# Patient Record
Sex: MALE | Race: WHITE | NOT HISPANIC OR LATINO | Employment: OTHER | URBAN - METROPOLITAN AREA
[De-identification: names, ages, dates, MRNs, and addresses within clinical notes are randomized per-mention and may not be internally consistent; named-entity substitution may affect disease eponyms.]

---

## 2023-11-01 ENCOUNTER — HOSPITAL ENCOUNTER (OUTPATIENT)
Facility: HOSPITAL | Age: 78
Setting detail: OBSERVATION
Discharge: HOME | End: 2023-11-03
Attending: EMERGENCY MEDICINE | Admitting: INTERNAL MEDICINE
Payer: MEDICARE

## 2023-11-01 ENCOUNTER — APPOINTMENT (OUTPATIENT)
Dept: RADIOLOGY | Facility: HOSPITAL | Age: 78
End: 2023-11-01
Payer: MEDICARE
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
## 2023-11-01 DIAGNOSIS — S09.90XA CLOSED HEAD INJURY, INITIAL ENCOUNTER: Primary | ICD-10-CM

## 2023-11-01 LAB
ALBUMIN SERPL BCP-MCNC: 4.1 G/DL (ref 3.4–5)
ALP SERPL-CCNC: 95 U/L (ref 33–136)
ALT SERPL W P-5'-P-CCNC: 10 U/L (ref 10–52)
ANION GAP SERPL CALC-SCNC: 15 MMOL/L (ref 10–20)
APPEARANCE UR: CLEAR
AST SERPL W P-5'-P-CCNC: 12 U/L (ref 9–39)
BACTERIA #/AREA URNS AUTO: ABNORMAL /HPF
BILIRUB SERPL-MCNC: 0.4 MG/DL (ref 0–1.2)
BILIRUB UR STRIP.AUTO-MCNC: NEGATIVE MG/DL
BUN SERPL-MCNC: 40 MG/DL (ref 6–23)
CALCIUM SERPL-MCNC: 9.3 MG/DL (ref 8.6–10.3)
CHLORIDE SERPL-SCNC: 107 MMOL/L (ref 98–107)
CK SERPL-CCNC: 74 U/L (ref 0–325)
CO2 SERPL-SCNC: 27 MMOL/L (ref 21–32)
COLOR UR: ABNORMAL
CREAT SERPL-MCNC: 1.34 MG/DL (ref 0.5–1.3)
ERYTHROCYTE [DISTWIDTH] IN BLOOD BY AUTOMATED COUNT: 15.7 % (ref 11.5–14.5)
GFR SERPL CREATININE-BSD FRML MDRD: 54 ML/MIN/1.73M*2
GLUCOSE SERPL-MCNC: 110 MG/DL (ref 74–99)
GLUCOSE UR STRIP.AUTO-MCNC: NEGATIVE MG/DL
HCT VFR BLD AUTO: 38.5 % (ref 41–52)
HGB BLD-MCNC: 12.3 G/DL (ref 13.5–17.5)
INR PPP: 1 (ref 0.9–1.1)
KETONES UR STRIP.AUTO-MCNC: NEGATIVE MG/DL
LEUKOCYTE ESTERASE UR QL STRIP.AUTO: ABNORMAL
MAGNESIUM SERPL-MCNC: 2 MG/DL (ref 1.6–2.4)
MCH RBC QN AUTO: 29.8 PG (ref 26–34)
MCHC RBC AUTO-ENTMCNC: 31.9 G/DL (ref 32–36)
MCV RBC AUTO: 93 FL (ref 80–100)
NITRITE UR QL STRIP.AUTO: NEGATIVE
NRBC BLD-RTO: 0 /100 WBCS (ref 0–0)
PH UR STRIP.AUTO: 7 [PH]
PHOSPHATE SERPL-MCNC: 3.4 MG/DL (ref 2.5–4.9)
PLATELET # BLD AUTO: 260 X10*3/UL (ref 150–450)
POTASSIUM SERPL-SCNC: 5 MMOL/L (ref 3.5–5.3)
PROT SERPL-MCNC: 7.2 G/DL (ref 6.4–8.2)
PROT UR STRIP.AUTO-MCNC: NEGATIVE MG/DL
PROTHROMBIN TIME: 11.2 SECONDS (ref 9.8–12.8)
RBC # BLD AUTO: 4.13 X10*6/UL (ref 4.5–5.9)
RBC # UR STRIP.AUTO: NEGATIVE /UL
RBC #/AREA URNS AUTO: ABNORMAL /HPF
SODIUM SERPL-SCNC: 144 MMOL/L (ref 136–145)
SP GR UR STRIP.AUTO: 1
UROBILINOGEN UR STRIP.AUTO-MCNC: <2 MG/DL
WBC # BLD AUTO: 8.4 X10*3/UL (ref 4.4–11.3)
WBC #/AREA URNS AUTO: ABNORMAL /HPF

## 2023-11-01 PROCEDURE — 71046 X-RAY EXAM CHEST 2 VIEWS: CPT | Mod: FR

## 2023-11-01 PROCEDURE — 73070 X-RAY EXAM OF ELBOW: CPT | Mod: RT,FR

## 2023-11-01 PROCEDURE — 99285 EMERGENCY DEPT VISIT HI MDM: CPT | Mod: 25 | Performed by: EMERGENCY MEDICINE

## 2023-11-01 PROCEDURE — 36415 COLL VENOUS BLD VENIPUNCTURE: CPT

## 2023-11-01 PROCEDURE — 70450 CT HEAD/BRAIN W/O DYE: CPT

## 2023-11-01 PROCEDURE — 96360 HYDRATION IV INFUSION INIT: CPT | Performed by: EMERGENCY MEDICINE

## 2023-11-01 PROCEDURE — 82550 ASSAY OF CK (CPK): CPT

## 2023-11-01 PROCEDURE — 73070 X-RAY EXAM OF ELBOW: CPT | Mod: RIGHT SIDE | Performed by: RADIOLOGY

## 2023-11-01 PROCEDURE — 83735 ASSAY OF MAGNESIUM: CPT

## 2023-11-01 PROCEDURE — 85027 COMPLETE CBC AUTOMATED: CPT

## 2023-11-01 PROCEDURE — 87086 URINE CULTURE/COLONY COUNT: CPT | Mod: AHULAB

## 2023-11-01 PROCEDURE — 2500000004 HC RX 250 GENERAL PHARMACY W/ HCPCS (ALT 636 FOR OP/ED): Performed by: EMERGENCY MEDICINE

## 2023-11-01 PROCEDURE — 72125 CT NECK SPINE W/O DYE: CPT

## 2023-11-01 PROCEDURE — 84100 ASSAY OF PHOSPHORUS: CPT

## 2023-11-01 PROCEDURE — 80053 COMPREHEN METABOLIC PANEL: CPT

## 2023-11-01 PROCEDURE — S0166 INJ OLANZAPINE 2.5MG: HCPCS | Performed by: EMERGENCY MEDICINE

## 2023-11-01 PROCEDURE — 72170 X-RAY EXAM OF PELVIS: CPT | Mod: FOREIGN READ | Performed by: RADIOLOGY

## 2023-11-01 PROCEDURE — 85610 PROTHROMBIN TIME: CPT

## 2023-11-01 PROCEDURE — 70450 CT HEAD/BRAIN W/O DYE: CPT | Performed by: SURGERY

## 2023-11-01 PROCEDURE — 72170 X-RAY EXAM OF PELVIS: CPT | Mod: FR

## 2023-11-01 PROCEDURE — 81003 URINALYSIS AUTO W/O SCOPE: CPT

## 2023-11-01 PROCEDURE — 72125 CT NECK SPINE W/O DYE: CPT | Performed by: SURGERY

## 2023-11-01 PROCEDURE — 2500000004 HC RX 250 GENERAL PHARMACY W/ HCPCS (ALT 636 FOR OP/ED)

## 2023-11-01 PROCEDURE — 71046 X-RAY EXAM CHEST 2 VIEWS: CPT | Mod: FOREIGN READ | Performed by: RADIOLOGY

## 2023-11-01 RX ORDER — OLANZAPINE 10 MG/2ML
2.5 INJECTION, POWDER, FOR SOLUTION INTRAMUSCULAR EVERY 6 HOURS PRN
Status: DISCONTINUED | OUTPATIENT
Start: 2023-11-01 | End: 2023-11-03 | Stop reason: HOSPADM

## 2023-11-01 RX ADMIN — SODIUM CHLORIDE, SODIUM LACTATE, POTASSIUM CHLORIDE, AND CALCIUM CHLORIDE 1000 ML: 600; 310; 30; 20 INJECTION, SOLUTION INTRAVENOUS at 20:00

## 2023-11-01 RX ADMIN — OLANZAPINE 2.5 MG: 10 INJECTION, POWDER, FOR SOLUTION INTRAMUSCULAR at 20:27

## 2023-11-01 ASSESSMENT — COLUMBIA-SUICIDE SEVERITY RATING SCALE - C-SSRS
1. IN THE PAST MONTH, HAVE YOU WISHED YOU WERE DEAD OR WISHED YOU COULD GO TO SLEEP AND NOT WAKE UP?: NO
2. HAVE YOU ACTUALLY HAD ANY THOUGHTS OF KILLING YOURSELF?: NO
6. HAVE YOU EVER DONE ANYTHING, STARTED TO DO ANYTHING, OR PREPARED TO DO ANYTHING TO END YOUR LIFE?: NO

## 2023-11-01 ASSESSMENT — PAIN - FUNCTIONAL ASSESSMENT: PAIN_FUNCTIONAL_ASSESSMENT: 0-10

## 2023-11-01 ASSESSMENT — PAIN SCALES - GENERAL: PAINLEVEL_OUTOF10: 0 - NO PAIN

## 2023-11-01 NOTE — ED PROVIDER NOTES
HPI   Chief Complaint   Patient presents with    Fall       HPI  Patient is a 78-year-old male with past medical history of multi-infarct dementia, A-fib, DVT on Xarelto hypertension, hyperlipidemia, dysarthria, BPH, CVA with left-sided residual weakness the presents emergency room for after fall.  Patient is a poor historian and was unable to give me any information about the mechanism of the fall.  Patient is coming from a SNF/memory care per EMS denies loss of consciousness suggest a mechanical fall.  Patient had a witnessed fall he hit his head on the wall after tripping over his feet and then landed backwards falling on his gluts. Per EMS and SNF this is his normal mentation.  She has a right-sided temporal abrasion with some bleeding.                   [place] Coma Scale Score: 14                  Patient History   Past Medical History:   Diagnosis Date    Personal history of other diseases of the circulatory system     History of hypertension    Personal history of other diseases of the circulatory system 06/04/2018    History of atrial fibrillation    Personal history of other endocrine, nutritional and metabolic disease     History of hyperlipidemia    Personal history of other medical treatment     History of cardioversion    Unspecified atrial fibrillation (CMS/HCC)     Atrial fibrillation status post cardioversion     Past Surgical History:   Procedure Laterality Date    MR HEAD ANGIO WO IV CONTRAST  2/25/2023    MR HEAD ANGIO WO IV CONTRAST PAR MRI    MR NECK ANGIO WO IV CONTRAST  2/25/2023    MR NECK ANGIO WO IV CONTRAST PAR MRI     No family history on file.  Social History     Tobacco Use    Smoking status: Not on file    Smokeless tobacco: Not on file   Substance Use Topics    Alcohol use: Not on file    Drug use: Not on file       Physical Exam   ED Triage Vitals [11/01/23 1823]   Temp Heart Rate Resp BP   36.4 °C (97.6 °F) 93 18 148/79      SpO2 Temp Source Heart Rate Source Patient Position   100  % Temporal -- --      BP Location FiO2 (%)     -- --       Physical Exam  Constitutional:       Appearance: Normal appearance. He is normal weight.   HENT:      Head:      Comments: Traumatic, right side laceration temporal region     Right Ear: Tympanic membrane and ear canal normal.      Left Ear: Tympanic membrane and ear canal normal.      Nose: Nose normal.      Mouth/Throat:      Mouth: Mucous membranes are moist.      Pharynx: Oropharynx is clear.   Eyes:      Conjunctiva/sclera: Conjunctivae normal.      Pupils: Pupils are equal, round, and reactive to light.   Cardiovascular:      Rate and Rhythm: Tachycardia present. Rhythm irregular.      Pulses: Normal pulses.      Heart sounds: Normal heart sounds.   Pulmonary:      Effort: Pulmonary effort is normal.      Breath sounds: Normal breath sounds.   Abdominal:      General: Abdomen is flat.      Palpations: Abdomen is soft.   Musculoskeletal:         General: Normal range of motion.      Cervical back: Normal range of motion and neck supple.   Skin:     General: Skin is warm and dry.      Capillary Refill: Capillary refill takes less than 2 seconds.      Coloration: Skin is not jaundiced.      Findings: No erythema, lesion or rash.      Comments: Right abrasion on the dorsal proximal forearm closer to elbow   Neurological:      Mental Status: He is alert. Mental status is at baseline.      Comments: AXO1, able to follow commands, speaking with word salad however able to say yes or no questions.  Physical exam states that the pain on palpation then later states not having pain.         ED Course & MDM        Medical Decision Making  Patient is a 78-year-old male with past medical history of multi-infarct dementia, A-fib, DVT on Xarelto hypertension, hyperlipidemia, dysarthria, BPH, CVA with left-sided residual weakness the presents emergency room for after fall.  Given the fall, there needs to be some consideration for cerebral hemorrhage, subdural  hemorrhage, epidural hemorrhage.  EKG, phosphorus, magnesium, creatinine kinase, CBC, CMP, INR, UA have been ordered.  Right-sided x-ray of elbow, x-ray of pelvis, chest x-ray, CT C-spine without contrast, CT head without contrast been ordered.    CBC within normal limits.  CMP shows a elevated creatinine of 1.34 with a BUN of 40 not indicative of an ELLY.  Phosphorus, magnesium, creatinine and kinase is within normal limits.  Normal and INR.  1 L bolus of LR given. Patient continues to be slightly agitated and tries to get out of bed.  Patient was put in Trendelenburg and given 2.5 mg of Zyprexa subcutaneous. EKG shows a heart rate of 90, incalculable CO interval, QRS 82, , A-fib, no ST elevation, no ST depression, PVCs seen in leads II, V6, V5, V4.      Chest x-ray shows no pneumothorax or pleural effusion.  X-ray of pelvis shows no traumatic fractures, but osteoarthritis.  X ray of the elbow shows no traumatic fracture.  CT head without contrast shows no evidence of acute intracranial hemorrhage.  CT cervical spine shows no C-spine fractures or traumatic process.    UA shows a small leukocyte esterase negative for nitrates minimal bacteria in the urine, which indicates less likely a UTI.  Patient is being admitted for observation.      Procedure  Procedures     Purnima Frost MD  Resident  11/01/23 [id number]

## 2023-11-01 NOTE — ED TRIAGE NOTES
Pt had a mechanical fall with a head injury. Per EMS pt is from memory care and this is normal mentation. Pt is not on anticoagulation therapy. EMS states the SNF denies +LOC.
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
## 2023-11-02 LAB
ANION GAP SERPL CALC-SCNC: 15 MMOL/L (ref 10–20)
BUN SERPL-MCNC: 29 MG/DL (ref 6–23)
CALCIUM SERPL-MCNC: 9.2 MG/DL (ref 8.6–10.3)
CARDIAC TROPONIN I PNL SERPL HS: 10 NG/L (ref 0–20)
CHLORIDE SERPL-SCNC: 109 MMOL/L (ref 98–107)
CO2 SERPL-SCNC: 22 MMOL/L (ref 21–32)
CREAT SERPL-MCNC: 1.15 MG/DL (ref 0.5–1.3)
ERYTHROCYTE [DISTWIDTH] IN BLOOD BY AUTOMATED COUNT: 15.3 % (ref 11.5–14.5)
GFR SERPL CREATININE-BSD FRML MDRD: 65 ML/MIN/1.73M*2
GLUCOSE SERPL-MCNC: 166 MG/DL (ref 74–99)
HCT VFR BLD AUTO: 46.3 % (ref 41–52)
HGB BLD-MCNC: 14.5 G/DL (ref 13.5–17.5)
HOLD SPECIMEN: NORMAL
MCH RBC QN AUTO: 29.8 PG (ref 26–34)
MCHC RBC AUTO-ENTMCNC: 31.3 G/DL (ref 32–36)
MCV RBC AUTO: 95 FL (ref 80–100)
NRBC BLD-RTO: 0 /100 WBCS (ref 0–0)
PLATELET # BLD AUTO: 281 X10*3/UL (ref 150–450)
POTASSIUM SERPL-SCNC: 4.9 MMOL/L (ref 3.5–5.3)
POTASSIUM SERPL-SCNC: 5.8 MMOL/L (ref 3.5–5.3)
RBC # BLD AUTO: 4.86 X10*6/UL (ref 4.5–5.9)
SODIUM SERPL-SCNC: 140 MMOL/L (ref 136–145)
WBC # BLD AUTO: 10.1 X10*3/UL (ref 4.4–11.3)

## 2023-11-02 PROCEDURE — 84132 ASSAY OF SERUM POTASSIUM: CPT | Mod: 59 | Performed by: PHYSICIAN ASSISTANT

## 2023-11-02 PROCEDURE — 99223 1ST HOSP IP/OBS HIGH 75: CPT | Performed by: PHYSICIAN ASSISTANT

## 2023-11-02 PROCEDURE — 36415 COLL VENOUS BLD VENIPUNCTURE: CPT | Performed by: PHYSICIAN ASSISTANT

## 2023-11-02 PROCEDURE — 96361 HYDRATE IV INFUSION ADD-ON: CPT

## 2023-11-02 PROCEDURE — 2500000004 HC RX 250 GENERAL PHARMACY W/ HCPCS (ALT 636 FOR OP/ED): Performed by: PHYSICIAN ASSISTANT

## 2023-11-02 PROCEDURE — 80048 BASIC METABOLIC PNL TOTAL CA: CPT | Performed by: PHYSICIAN ASSISTANT

## 2023-11-02 PROCEDURE — 96372 THER/PROPH/DIAG INJ SC/IM: CPT | Performed by: PHYSICIAN ASSISTANT

## 2023-11-02 PROCEDURE — G0378 HOSPITAL OBSERVATION PER HR: HCPCS

## 2023-11-02 PROCEDURE — 85027 COMPLETE CBC AUTOMATED: CPT | Performed by: PHYSICIAN ASSISTANT

## 2023-11-02 PROCEDURE — 84484 ASSAY OF TROPONIN QUANT: CPT | Performed by: PHYSICIAN ASSISTANT

## 2023-11-02 RX ORDER — ACETAMINOPHEN 325 MG/1
2 TABLET ORAL EVERY 6 HOURS PRN
COMMUNITY

## 2023-11-02 RX ORDER — PANTOPRAZOLE SODIUM 40 MG/1
40 TABLET, DELAYED RELEASE ORAL
Status: DISCONTINUED | OUTPATIENT
Start: 2023-11-03 | End: 2023-11-03 | Stop reason: HOSPADM

## 2023-11-02 RX ORDER — LOPERAMIDE HYDROCHLORIDE 2 MG/1
2 CAPSULE ORAL EVERY 8 HOURS PRN
COMMUNITY

## 2023-11-02 RX ORDER — LORAZEPAM 1 MG/1
1 TABLET ORAL EVERY 8 HOURS PRN
Status: DISCONTINUED | OUTPATIENT
Start: 2023-11-02 | End: 2023-11-03 | Stop reason: HOSPADM

## 2023-11-02 RX ORDER — ACETAMINOPHEN, DIPHENHYDRAMINE HCL, PHENYLEPHRINE HCL 325; 25; 5 MG/1; MG/1; MG/1
1 TABLET ORAL NIGHTLY PRN
COMMUNITY

## 2023-11-02 RX ORDER — SERTRALINE HYDROCHLORIDE 50 MG/1
100 TABLET, FILM COATED ORAL DAILY
Status: DISCONTINUED | OUTPATIENT
Start: 2023-11-02 | End: 2023-11-03 | Stop reason: HOSPADM

## 2023-11-02 RX ORDER — ACETAMINOPHEN 160 MG/5ML
650 SOLUTION ORAL EVERY 4 HOURS PRN
Status: DISCONTINUED | OUTPATIENT
Start: 2023-11-02 | End: 2023-11-03 | Stop reason: HOSPADM

## 2023-11-02 RX ORDER — LORAZEPAM 1 MG/1
1 TABLET ORAL EVERY 8 HOURS PRN
COMMUNITY

## 2023-11-02 RX ORDER — ALBUTEROL SULFATE 90 UG/1
2 AEROSOL, METERED RESPIRATORY (INHALATION) EVERY 4 HOURS PRN
Status: DISCONTINUED | OUTPATIENT
Start: 2023-11-02 | End: 2023-11-02

## 2023-11-02 RX ORDER — POLYETHYLENE GLYCOL 3350 17 G/17G
17 POWDER, FOR SOLUTION ORAL DAILY
Status: DISCONTINUED | OUTPATIENT
Start: 2023-11-02 | End: 2023-11-03 | Stop reason: HOSPADM

## 2023-11-02 RX ORDER — TAMSULOSIN HYDROCHLORIDE 0.4 MG/1
0.4 CAPSULE ORAL NIGHTLY
Status: DISCONTINUED | OUTPATIENT
Start: 2023-11-02 | End: 2023-11-03 | Stop reason: HOSPADM

## 2023-11-02 RX ORDER — TAMSULOSIN HYDROCHLORIDE 0.4 MG/1
1 CAPSULE ORAL NIGHTLY
COMMUNITY

## 2023-11-02 RX ORDER — TALC
9 POWDER (GRAM) TOPICAL NIGHTLY PRN
Status: DISCONTINUED | OUTPATIENT
Start: 2023-11-02 | End: 2023-11-03 | Stop reason: HOSPADM

## 2023-11-02 RX ORDER — ACETAMINOPHEN 650 MG/1
650 SUPPOSITORY RECTAL EVERY 4 HOURS PRN
Status: DISCONTINUED | OUTPATIENT
Start: 2023-11-02 | End: 2023-11-03 | Stop reason: HOSPADM

## 2023-11-02 RX ORDER — ALBUTEROL SULFATE 0.83 MG/ML
2.5 SOLUTION RESPIRATORY (INHALATION) EVERY 4 HOURS PRN
Status: DISCONTINUED | OUTPATIENT
Start: 2023-11-02 | End: 2023-11-03 | Stop reason: HOSPADM

## 2023-11-02 RX ORDER — ALBUTEROL SULFATE 90 UG/1
2 AEROSOL, METERED RESPIRATORY (INHALATION) EVERY 4 HOURS PRN
COMMUNITY

## 2023-11-02 RX ORDER — PANTOPRAZOLE SODIUM 40 MG/10ML
40 INJECTION, POWDER, LYOPHILIZED, FOR SOLUTION INTRAVENOUS
Status: DISCONTINUED | OUTPATIENT
Start: 2023-11-03 | End: 2023-11-03 | Stop reason: HOSPADM

## 2023-11-02 RX ORDER — ENOXAPARIN SODIUM 100 MG/ML
40 INJECTION SUBCUTANEOUS DAILY
Status: DISCONTINUED | OUTPATIENT
Start: 2023-11-02 | End: 2023-11-03 | Stop reason: HOSPADM

## 2023-11-02 RX ORDER — DM/P-EPHED/ACETAMINOPH/DOXYLAM 30-7.5/3
1 LIQUID (ML) ORAL EVERY 4 HOURS PRN
COMMUNITY
Start: 2023-02-27

## 2023-11-02 RX ORDER — CHOLECALCIFEROL (VITAMIN D3) 25 MCG
1 TABLET ORAL
COMMUNITY

## 2023-11-02 RX ORDER — LISINOPRIL 10 MG/1
10 TABLET ORAL
COMMUNITY

## 2023-11-02 RX ORDER — ONDANSETRON 4 MG/1
4 TABLET, ORALLY DISINTEGRATING ORAL EVERY 8 HOURS PRN
Status: DISCONTINUED | OUTPATIENT
Start: 2023-11-02 | End: 2023-11-03 | Stop reason: HOSPADM

## 2023-11-02 RX ORDER — SERTRALINE HYDROCHLORIDE 100 MG/1
1 TABLET, FILM COATED ORAL
COMMUNITY

## 2023-11-02 RX ORDER — ONDANSETRON HYDROCHLORIDE 2 MG/ML
4 INJECTION, SOLUTION INTRAVENOUS EVERY 8 HOURS PRN
Status: DISCONTINUED | OUTPATIENT
Start: 2023-11-02 | End: 2023-11-03 | Stop reason: HOSPADM

## 2023-11-02 RX ORDER — ONDANSETRON 4 MG/1
1 TABLET, FILM COATED ORAL EVERY 4 HOURS PRN
COMMUNITY

## 2023-11-02 RX ORDER — LISINOPRIL 10 MG/1
10 TABLET ORAL DAILY
Status: DISCONTINUED | OUTPATIENT
Start: 2023-11-02 | End: 2023-11-03 | Stop reason: HOSPADM

## 2023-11-02 RX ORDER — ACETAMINOPHEN 325 MG/1
650 TABLET ORAL EVERY 4 HOURS PRN
Status: DISCONTINUED | OUTPATIENT
Start: 2023-11-02 | End: 2023-11-03 | Stop reason: HOSPADM

## 2023-11-02 RX ADMIN — POLYETHYLENE GLYCOL 3350 17 G: 17 POWDER, FOR SOLUTION ORAL at 15:02

## 2023-11-02 RX ADMIN — SODIUM CHLORIDE 500 ML: 9 INJECTION, SOLUTION INTRAVENOUS at 15:02

## 2023-11-02 RX ADMIN — SERTRALINE HYDROCHLORIDE 100 MG: 50 TABLET ORAL at 15:02

## 2023-11-02 RX ADMIN — ENOXAPARIN SODIUM 40 MG: 40 INJECTION SUBCUTANEOUS at 15:05

## 2023-11-02 RX ADMIN — TAMSULOSIN HYDROCHLORIDE 0.4 MG: 0.4 CAPSULE ORAL at 21:33

## 2023-11-02 SDOH — SOCIAL STABILITY: SOCIAL NETWORK
DO YOU BELONG TO ANY CLUBS OR ORGANIZATIONS SUCH AS CHURCH GROUPS UNIONS, FRATERNAL OR ATHLETIC GROUPS, OR SCHOOL GROUPS?: NO

## 2023-11-02 SDOH — SOCIAL STABILITY: SOCIAL INSECURITY: WITHIN THE LAST YEAR, HAVE YOU BEEN AFRAID OF YOUR PARTNER OR EX-PARTNER?: NO

## 2023-11-02 SDOH — SOCIAL STABILITY: SOCIAL NETWORK: ARE YOU MARRIED, WIDOWED, DIVORCED, SEPARATED, NEVER MARRIED, OR LIVING WITH A PARTNER?: WIDOWED

## 2023-11-02 SDOH — ECONOMIC STABILITY: FOOD INSECURITY: WITHIN THE PAST 12 MONTHS, YOU WORRIED THAT YOUR FOOD WOULD RUN OUT BEFORE YOU GOT MONEY TO BUY MORE.: NEVER TRUE

## 2023-11-02 SDOH — SOCIAL STABILITY: SOCIAL INSECURITY: DOES ANYONE TRY TO KEEP YOU FROM HAVING/CONTACTING OTHER FRIENDS OR DOING THINGS OUTSIDE YOUR HOME?: NO

## 2023-11-02 SDOH — ECONOMIC STABILITY: HOUSING INSECURITY: IN THE LAST 12 MONTHS, HOW MANY PLACES HAVE YOU LIVED?: 1

## 2023-11-02 SDOH — ECONOMIC STABILITY: TRANSPORTATION INSECURITY
IN THE PAST 12 MONTHS, HAS THE LACK OF TRANSPORTATION KEPT YOU FROM MEDICAL APPOINTMENTS OR FROM GETTING MEDICATIONS?: NO

## 2023-11-02 SDOH — SOCIAL STABILITY: SOCIAL INSECURITY: WERE YOU ABLE TO COMPLETE ALL THE BEHAVIORAL HEALTH SCREENINGS?: YES

## 2023-11-02 SDOH — HEALTH STABILITY: MENTAL HEALTH: HOW OFTEN DO YOU HAVE 6 OR MORE DRINKS ON ONE OCCASION?: NEVER

## 2023-11-02 SDOH — SOCIAL STABILITY: SOCIAL INSECURITY: WITHIN THE LAST YEAR, HAVE YOU BEEN HUMILIATED OR EMOTIONALLY ABUSED IN OTHER WAYS BY YOUR PARTNER OR EX-PARTNER?: NO

## 2023-11-02 SDOH — SOCIAL STABILITY: SOCIAL INSECURITY: ABUSE: ADULT

## 2023-11-02 SDOH — ECONOMIC STABILITY: INCOME INSECURITY: IN THE LAST 12 MONTHS, WAS THERE A TIME WHEN YOU WERE NOT ABLE TO PAY THE MORTGAGE OR RENT ON TIME?: NO

## 2023-11-02 SDOH — ECONOMIC STABILITY: HOUSING INSECURITY
IN THE LAST 12 MONTHS, WAS THERE A TIME WHEN YOU DID NOT HAVE A STEADY PLACE TO SLEEP OR SLEPT IN A SHELTER (INCLUDING NOW)?: NO

## 2023-11-02 SDOH — ECONOMIC STABILITY: INCOME INSECURITY: IN THE PAST 12 MONTHS, HAS THE ELECTRIC, GAS, OIL, OR WATER COMPANY THREATENED TO SHUT OFF SERVICE IN YOUR HOME?: NO

## 2023-11-02 SDOH — HEALTH STABILITY: MENTAL HEALTH: HOW OFTEN DO YOU HAVE A DRINK CONTAINING ALCOHOL?: NEVER

## 2023-11-02 SDOH — SOCIAL STABILITY: SOCIAL INSECURITY: DO YOU FEEL UNSAFE GOING BACK TO THE PLACE WHERE YOU ARE LIVING?: YES

## 2023-11-02 SDOH — SOCIAL STABILITY: SOCIAL NETWORK: HOW OFTEN DO YOU ATTENT MEETINGS OF THE CLUB OR ORGANIZATION YOU BELONG TO?: NEVER

## 2023-11-02 SDOH — ECONOMIC STABILITY: FOOD INSECURITY: WITHIN THE PAST 12 MONTHS, THE FOOD YOU BOUGHT JUST DIDN'T LAST AND YOU DIDN'T HAVE MONEY TO GET MORE.: NEVER TRUE

## 2023-11-02 SDOH — HEALTH STABILITY: PHYSICAL HEALTH: ON AVERAGE, HOW MANY DAYS PER WEEK DO YOU ENGAGE IN MODERATE TO STRENUOUS EXERCISE (LIKE A BRISK WALK)?: 0 DAYS

## 2023-11-02 SDOH — SOCIAL STABILITY: SOCIAL INSECURITY
WITHIN THE LAST YEAR, HAVE TO BEEN RAPED OR FORCED TO HAVE ANY KIND OF SEXUAL ACTIVITY BY YOUR PARTNER OR EX-PARTNER?: NO

## 2023-11-02 SDOH — SOCIAL STABILITY: SOCIAL INSECURITY
WITHIN THE LAST YEAR, HAVE YOU BEEN KICKED, HIT, SLAPPED, OR OTHERWISE PHYSICALLY HURT BY YOUR PARTNER OR EX-PARTNER?: NO

## 2023-11-02 SDOH — SOCIAL STABILITY: SOCIAL NETWORK: HOW OFTEN DO YOU GET TOGETHER WITH FRIENDS OR RELATIVES?: TWICE A WEEK

## 2023-11-02 SDOH — HEALTH STABILITY: MENTAL HEALTH
STRESS IS WHEN SOMEONE FEELS TENSE, NERVOUS, ANXIOUS, OR CAN'T SLEEP AT NIGHT BECAUSE THEIR MIND IS TROUBLED. HOW STRESSED ARE YOU?: NOT AT ALL

## 2023-11-02 SDOH — SOCIAL STABILITY: SOCIAL INSECURITY: HAVE YOU HAD THOUGHTS OF HARMING ANYONE ELSE?: NO

## 2023-11-02 SDOH — SOCIAL STABILITY: SOCIAL INSECURITY: ARE YOU OR HAVE YOU BEEN THREATENED OR ABUSED PHYSICALLY, EMOTIONALLY, OR SEXUALLY BY ANYONE?: NO

## 2023-11-02 SDOH — ECONOMIC STABILITY: INCOME INSECURITY: HOW HARD IS IT FOR YOU TO PAY FOR THE VERY BASICS LIKE FOOD, HOUSING, MEDICAL CARE, AND HEATING?: NOT HARD AT ALL

## 2023-11-02 SDOH — SOCIAL STABILITY: SOCIAL INSECURITY: HAS ANYONE EVER THREATENED TO HURT YOUR FAMILY OR YOUR PETS?: NO

## 2023-11-02 SDOH — HEALTH STABILITY: PHYSICAL HEALTH: ON AVERAGE, HOW MANY MINUTES DO YOU ENGAGE IN EXERCISE AT THIS LEVEL?: 0 MIN

## 2023-11-02 SDOH — SOCIAL STABILITY: SOCIAL NETWORK: IN A TYPICAL WEEK, HOW MANY TIMES DO YOU TALK ON THE PHONE WITH FAMILY, FRIENDS, OR NEIGHBORS?: NEVER

## 2023-11-02 SDOH — SOCIAL STABILITY: SOCIAL INSECURITY: DO YOU FEEL ANYONE HAS EXPLOITED OR TAKEN ADVANTAGE OF YOU FINANCIALLY OR OF YOUR PERSONAL PROPERTY?: NO

## 2023-11-02 SDOH — HEALTH STABILITY: MENTAL HEALTH: HOW MANY STANDARD DRINKS CONTAINING ALCOHOL DO YOU HAVE ON A TYPICAL DAY?: PATIENT DOES NOT DRINK

## 2023-11-02 SDOH — SOCIAL STABILITY: SOCIAL NETWORK: HOW OFTEN DO YOU ATTEND CHURCH OR RELIGIOUS SERVICES?: NEVER

## 2023-11-02 SDOH — ECONOMIC STABILITY: TRANSPORTATION INSECURITY
IN THE PAST 12 MONTHS, HAS LACK OF TRANSPORTATION KEPT YOU FROM MEETINGS, WORK, OR FROM GETTING THINGS NEEDED FOR DAILY LIVING?: NO

## 2023-11-02 SDOH — SOCIAL STABILITY: SOCIAL INSECURITY: ARE THERE ANY APPARENT SIGNS OF INJURIES/BEHAVIORS THAT COULD BE RELATED TO ABUSE/NEGLECT?: NO

## 2023-11-02 ASSESSMENT — COGNITIVE AND FUNCTIONAL STATUS - GENERAL
STANDING UP FROM CHAIR USING ARMS: A LOT
HELP NEEDED FOR BATHING: A LITTLE
TOILETING: TOTAL
MOBILITY SCORE: 16
DRESSING REGULAR UPPER BODY CLOTHING: A LITTLE
WALKING IN HOSPITAL ROOM: A LOT
CLIMB 3 TO 5 STEPS WITH RAILING: A LOT
DAILY ACTIVITIY SCORE: 16
STANDING UP FROM CHAIR USING ARMS: A LOT
DRESSING REGULAR UPPER BODY CLOTHING: A LITTLE
MOVING FROM LYING ON BACK TO SITTING ON SIDE OF FLAT BED WITH BEDRAILS: A LOT
EATING MEALS: A LITTLE
TOILETING: TOTAL
PERSONAL GROOMING: A LITTLE
HELP NEEDED FOR BATHING: A LITTLE
MOVING TO AND FROM BED TO CHAIR: A LOT
PERSONAL GROOMING: A LITTLE
DRESSING REGULAR LOWER BODY CLOTHING: A LITTLE
DRESSING REGULAR LOWER BODY CLOTHING: A LITTLE
EATING MEALS: A LITTLE
PATIENT BASELINE BEDBOUND: YES
MOBILITY SCORE: 14
MOVING TO AND FROM BED TO CHAIR: A LOT
CLIMB 3 TO 5 STEPS WITH RAILING: A LOT
DAILY ACTIVITIY SCORE: 16
WALKING IN HOSPITAL ROOM: A LOT

## 2023-11-02 ASSESSMENT — PATIENT HEALTH QUESTIONNAIRE - PHQ9
2. FEELING DOWN, DEPRESSED OR HOPELESS: NOT AT ALL
1. LITTLE INTEREST OR PLEASURE IN DOING THINGS: NOT AT ALL
SUM OF ALL RESPONSES TO PHQ9 QUESTIONS 1 & 2: 0

## 2023-11-02 ASSESSMENT — ACTIVITIES OF DAILY LIVING (ADL)
DRESSING YOURSELF: DEPENDENT
ADEQUATE_TO_COMPLETE_ADL: YES
ASSISTIVE_DEVICE: WALKER
HEARING - LEFT EAR: FUNCTIONAL
GROOMING: DEPENDENT
HEARING - RIGHT EAR: FUNCTIONAL
TOILETING: DEPENDENT
LACK_OF_TRANSPORTATION: NO
PATIENT'S MEMORY ADEQUATE TO SAFELY COMPLETE DAILY ACTIVITIES?: NO
JUDGMENT_ADEQUATE_SAFELY_COMPLETE_DAILY_ACTIVITIES: NO
BATHING: DEPENDENT
FEEDING YOURSELF: DEPENDENT
WALKS IN HOME: DEPENDENT

## 2023-11-02 ASSESSMENT — LIFESTYLE VARIABLES
HOW OFTEN DO YOU HAVE 6 OR MORE DRINKS ON ONE OCCASION: WEEKLY
AUDIT TOTAL SCORE: 0
HAVE YOU OR SOMEONE ELSE BEEN INJURED AS A RESULT OF YOUR DRINKING: NO
HOW OFTEN DURING THE LAST YEAR HAVE YOU NEEDED AN ALCOHOLIC DRINK FIRST THING IN THE MORNING TO GET YOURSELF GOING AFTER A NIGHT OF HEAVY DRINKING: NEVER
SUBSTANCE_ABUSE_PAST_12_MONTHS: NO
HOW MANY STANDARD DRINKS CONTAINING ALCOHOL DO YOU HAVE ON A TYPICAL DAY: 1 OR 2
SKIP TO QUESTIONS 9-10: 1
HOW OFTEN DURING THE LAST YEAR HAVE YOU BEEN UNABLE TO REMEMBER WHAT HAPPENED THE NIGHT BEFORE BECAUSE YOU HAD BEEN DRINKING: NEVER
AUDIT-C TOTAL SCORE: 6
AUDIT TOTAL SCORE: 6
HOW OFTEN DO YOU HAVE A DRINK CONTAINING ALCOHOL: 2-3 TIMES A WEEK
AUDIT-C TOTAL SCORE: 0
PRESCIPTION_ABUSE_PAST_12_MONTHS: NO
AUDIT-C TOTAL SCORE: 6
HAS A RELATIVE, FRIEND, DOCTOR, OR ANOTHER HEALTH PROFESSIONAL EXPRESSED CONCERN ABOUT YOUR DRINKING OR SUGGESTED YOU CUT DOWN: NO
HOW OFTEN DURING THE LAST YEAR HAVE YOU HAD A FEELING OF GUILT OR REMORSE AFTER DRINKING: NEVER
HOW OFTEN DURING THE LAST YEAR HAVE YOU FOUND THAT YOU WERE NOT ABLE TO STOP DRINKING ONCE YOU HAD STARTED: NEVER
SKIP TO QUESTIONS 9-10: 0
HOW OFTEN DURING THE LAST YEAR HAVE YOU FAILED TO DO WHAT WAS NORMALLY EXPECTED FROM YOU BECAUSE OF DRINKING: NEVER

## 2023-11-02 ASSESSMENT — ENCOUNTER SYMPTOMS
FEVER: 0
CHILLS: 0

## 2023-11-02 ASSESSMENT — PAIN SCALES - GENERAL: PAINLEVEL_OUTOF10: 0 - NO PAIN

## 2023-11-02 NOTE — H&P
History Of Present Illness  Rodri Whitfield is a 78 y.o. male PMHx CVA with residual left-sided weakness, A-fib, hypertension, hyperlipidemia, difficulty walking, BPH, vascular dementia who presents to the emergency room for the chief complaint of fall. Frequent falls per nursing home, has been seen frequently for similar presentation. History limited by poor historian, cognitive dysfunction. Per ED note, patient with witnessed fall, fell back onto glutes and struck head on wall. Per EMS and SNF was baseline mentation on presentation to ED. Right sided temporal abrasion noted.     ED workup: CT H negative for acute intracranial abnormality, CT CS negative for fracture, CXR negative for acute cardiopulmonary abnormality or rib fx, XR right elbow negative for fracture dislocation, XR pelvis negative for acute bony abnormality. EKG A.fib without ST T wave changes diagnostic for ischemia, UA negative for markers of infection. CBC no leukocytosis mild stable anemia noted. Cr 1.34 (baseline 1.1), BUN 40, electrolytes normal , CK 74, PT/INR wnl    Past Medical History  Past Medical History:   Diagnosis Date    Personal history of other diseases of the circulatory system     History of hypertension    Personal history of other diseases of the circulatory system 06/04/2018    History of atrial fibrillation    Personal history of other endocrine, nutritional and metabolic disease     History of hyperlipidemia    Personal history of other medical treatment     History of cardioversion    Unspecified atrial fibrillation (CMS/HCC)     Atrial fibrillation status post cardioversion       Surgical History  Past Surgical History:   Procedure Laterality Date    MR HEAD ANGIO WO IV CONTRAST  2/25/2023    MR HEAD ANGIO WO IV CONTRAST PAR MRI    MR NECK ANGIO WO IV CONTRAST  2/25/2023    MR NECK ANGIO WO IV CONTRAST PAR MRI        Social History  He has no history on file for tobacco use, alcohol use, and drug use.    Family History  No  family history on file.     Allergies  Patient has no known allergies.    Review of Systems   Constitutional:  Negative for chills and fever.        Difficult to obtain, patient unable to answer simple questions.         Physical Exam  Constitutional:       General: He is not in acute distress.     Appearance: He is not toxic-appearing.      Comments: Patient is alert, however unable to answer simple questions. He is unaware of where he is or why he came to hospital.   HENT:      Head: Normocephalic. Abrasion present. No raccoon eyes, Shaw's sign, contusion, masses, right periorbital erythema or left periorbital erythema.      Jaw: There is normal jaw occlusion. No tenderness or swelling.      Salivary Glands: Right salivary gland is not diffusely enlarged.        Comments: Right temporal abrasion noted. No TTP over face, head, jaw.     Right Ear: External ear normal.      Left Ear: External ear normal.      Nose: Nose normal. No congestion.      Mouth/Throat:      Mouth: Mucous membranes are moist.      Pharynx: Oropharynx is clear.   Eyes:      General:         Right eye: No discharge.         Left eye: No discharge.      Conjunctiva/sclera: Conjunctivae normal.      Pupils: Pupils are equal, round, and reactive to light.   Cardiovascular:      Rate and Rhythm: Normal rate. Rhythm irregular.      Heart sounds: No murmur heard.     No gallop.   Pulmonary:      Effort: Pulmonary effort is normal.      Breath sounds: No wheezing or rales.   Abdominal:      General: Abdomen is flat. Bowel sounds are normal.      Palpations: Abdomen is soft.      Tenderness: There is no abdominal tenderness. There is no guarding or rebound.      Comments: Abdomen soft, nontender, nondistended   Musculoskeletal:         General: No swelling or tenderness. Normal range of motion.      Cervical back: No rigidity or tenderness (No TTP over cervical spine).      Right lower leg: No edema.      Left lower leg: No edema.      Comments: No  "TTP over arms, legs, pelvis.   Skin:     General: Skin is warm and dry.      Findings: No erythema or rash.   Neurological:      General: No focal deficit present.      Mental Status: He is alert.      Cranial Nerves: No cranial nerve deficit.      Motor: No weakness.   Psychiatric:         Mood and Affect: Mood normal.         Thought Content: Thought content normal.          Last Recorded Vitals  Blood pressure 127/83, pulse 86, temperature 36.9 °C (98.4 °F), temperature source Temporal, resp. rate 18, height 1.676 m (5' 6\"), weight 59 kg (130 lb), SpO2 96 %.    Relevant Results    Scheduled medications  lisinopril, 10 mg, oral, Daily  [START ON 11/3/2023] pantoprazole, 40 mg, oral, Daily before breakfast   Or  [START ON 11/3/2023] pantoprazole, 40 mg, intravenous, Daily before breakfast  polyethylene glycol, 17 g, oral, Daily  sertraline, 100 mg, oral, Daily  sodium chloride, 500 mL, intravenous, Once  tamsulosin, 0.4 mg, oral, Nightly      Continuous medications     PRN medications  PRN medications: acetaminophen **OR** acetaminophen **OR** acetaminophen, albuterol, LORazepam, melatonin, OLANZapine, ondansetron ODT **OR** ondansetron  Results for orders placed or performed during the hospital encounter of 11/01/23 (from the past 24 hour(s))   Creatine Kinase   Result Value Ref Range    Creatine Kinase 74 0 - 325 U/L   Magnesium   Result Value Ref Range    Magnesium 2.00 1.60 - 2.40 mg/dL   Phosphorus   Result Value Ref Range    Phosphorus 3.4 2.5 - 4.9 mg/dL   CBC   Result Value Ref Range    WBC 8.4 4.4 - 11.3 x10*3/uL    nRBC 0.0 0.0 - 0.0 /100 WBCs    RBC 4.13 (L) 4.50 - 5.90 x10*6/uL    Hemoglobin 12.3 (L) 13.5 - 17.5 g/dL    Hematocrit 38.5 (L) 41.0 - 52.0 %    MCV 93 80 - 100 fL    MCH 29.8 26.0 - 34.0 pg    MCHC 31.9 (L) 32.0 - 36.0 g/dL    RDW 15.7 (H) 11.5 - 14.5 %    Platelets 260 150 - 450 x10*3/uL   Comprehensive metabolic panel   Result Value Ref Range    Glucose 110 (H) 74 - 99 mg/dL    Sodium 144 " 136 - 145 mmol/L    Potassium 5.0 3.5 - 5.3 mmol/L    Chloride 107 98 - 107 mmol/L    Bicarbonate 27 21 - 32 mmol/L    Anion Gap 15 10 - 20 mmol/L    Urea Nitrogen 40 (H) 6 - 23 mg/dL    Creatinine 1.34 (H) 0.50 - 1.30 mg/dL    eGFR 54 (L) >60 mL/min/1.73m*2    Calcium 9.3 8.6 - 10.3 mg/dL    Albumin 4.1 3.4 - 5.0 g/dL    Alkaline Phosphatase 95 33 - 136 U/L    Total Protein 7.2 6.4 - 8.2 g/dL    AST 12 9 - 39 U/L    Bilirubin, Total 0.4 0.0 - 1.2 mg/dL    ALT 10 10 - 52 U/L   Protime-INR   Result Value Ref Range    Protime 11.2 9.8 - 12.8 seconds    INR 1.0 0.9 - 1.1   Urinalysis with Reflex Microscopic and Culture   Result Value Ref Range    Color, Urine Straw Straw, Yellow    Appearance, Urine Clear Clear    Specific Gravity, Urine 1.004 (N) 1.005 - 1.035    pH, Urine 7.0 5.0, 5.5, 6.0, 6.5, 7.0, 7.5, 8.0    Protein, Urine NEGATIVE NEGATIVE mg/dL    Glucose, Urine NEGATIVE NEGATIVE mg/dL    Blood, Urine NEGATIVE NEGATIVE    Ketones, Urine NEGATIVE NEGATIVE mg/dL    Bilirubin, Urine NEGATIVE NEGATIVE    Urobilinogen, Urine <2.0 <2.0 mg/dL    Nitrite, Urine NEGATIVE NEGATIVE    Leukocyte Esterase, Urine SMALL (1+) (A) NEGATIVE   Extra Urine Gray Tube   Result Value Ref Range    Extra Tube Hold for add-ons.    Microscopic Only, Urine   Result Value Ref Range    WBC, Urine 1-5 1-5, NONE /HPF    RBC, Urine NONE NONE, 1-2, 3-5 /HPF    Bacteria, Urine 1+ (A) NONE SEEN /HPF     CT cervical spine wo IV contrast    Result Date: 11/1/2023  Interpreted By:  Nahun Espinoza, STUDY: CT HEAD WO IV CONTRAST; CT CERVICAL SPINE WO IV CONTRAST; ; 11/1/2023 8:22 pm   INDICATION: fall, right abrasion; Signs/Symptoms:fall, hx of dementia, unable to give good hx.   COMPARISON: Noncontrast CT exams of head and cervical spine of 09/18/2023.   ACCESSION NUMBER(S): [id number]; [id number]   ORDERING CLINICIAN: JESSE SALAZAR   TECHNIQUE: Noncontrast CT exams of the head and cervical spine with multiplanar reformations.   FINDINGS: BRAIN  PARENCHYMA: Advanced volume loss.  There is periventricular and subcortical white matter hypoattenuation, most in keeping with chronic microvascular ischemic change. Extensive left cerebral hemispheric encephalomalacia, as before. Multifocal hypodensities in cerebellar hemispheres and left basal ganglia region in keeping with chronic lacunar infarcts, as before. Gray-white matter interfaces are preserved. No mass, mass effect or midline shift.   HEMORRHAGE: No acute intracranial hemorrhage. VENTRICLES and EXTRA-AXIAL SPACES: Normal size. EXTRACRANIAL SOFT TISSUES: Within normal limits. PARANASAL SINUSES/MASTOIDS: The visualized paranasal sinuses and mastoid air cells are aerated. CALVARIUM: No depressed skull fracture. No destructive osseous lesion. Left craniotomy changes again seen.   OTHER FINDINGS: None.   CERVICAL SPINE:   ALIGNMENT: Mild grade 1 degenerative anterolisthesis at C4-C5 and C5-C6 and C7-T1, unchanged. Alignment is otherwise normal. VERTEBRAE: No acute fracture. Vertebral stature is maintained. Degenerative endplate osteophytosis and uncovertebral hypertrophy in conjunction with degenerative disc height loss, worst at C6-C7, where it is severe in degree. Moderate to severe multilevel hypertrophic facet osteoarthropathy, worse on the right side. SPINAL CANAL: No critical spinal canal stenosis. Moderate canal narrowing at C6-C7 secondary to disc osteophyte bulging. PREVERTEBRAL SOFT TISSUES: No prevertebral soft tissue swelling. LUNG APICES: Severe emphysema.   OTHER FINDINGS: None.       No evidence of acute intracranial abnormality.   No acute cervical spine fracture or malalignment.   Severe emphysema.     MACRO: None   Signed by: Nahun Espinoza 11/1/2023 8:32 PM Dictation workstation:   [id number]    CT head wo IV contrast    Result Date: 11/1/2023  Interpreted By:  Nahun Espinoza, STUDY: CT HEAD WO IV CONTRAST; CT CERVICAL SPINE WO IV CONTRAST; ; 11/1/2023 8:22 pm   INDICATION: fall, right  abrasion; Signs/Symptoms:fall, hx of dementia, unable to give good hx.   COMPARISON: Noncontrast CT exams of head and cervical spine of 09/18/2023.   ACCESSION NUMBER(S): [id number]; [id number]   ORDERING CLINICIAN: JESSE SALAZAR   TECHNIQUE: Noncontrast CT exams of the head and cervical spine with multiplanar reformations.   FINDINGS: BRAIN PARENCHYMA: Advanced volume loss.  There is periventricular and subcortical white matter hypoattenuation, most in keeping with chronic microvascular ischemic change. Extensive left cerebral hemispheric encephalomalacia, as before. Multifocal hypodensities in cerebellar hemispheres and left basal ganglia region in keeping with chronic lacunar infarcts, as before. Gray-white matter interfaces are preserved. No mass, mass effect or midline shift.   HEMORRHAGE: No acute intracranial hemorrhage. VENTRICLES and EXTRA-AXIAL SPACES: Normal size. EXTRACRANIAL SOFT TISSUES: Within normal limits. PARANASAL SINUSES/MASTOIDS: The visualized paranasal sinuses and mastoid air cells are aerated. CALVARIUM: No depressed skull fracture. No destructive osseous lesion. Left craniotomy changes again seen.   OTHER FINDINGS: None.   CERVICAL SPINE:   ALIGNMENT: Mild grade 1 degenerative anterolisthesis at C4-C5 and C5-C6 and C7-T1, unchanged. Alignment is otherwise normal. VERTEBRAE: No acute fracture. Vertebral stature is maintained. Degenerative endplate osteophytosis and uncovertebral hypertrophy in conjunction with degenerative disc height loss, worst at C6-C7, where it is severe in degree. Moderate to severe multilevel hypertrophic facet osteoarthropathy, worse on the right side. SPINAL CANAL: No critical spinal canal stenosis. Moderate canal narrowing at C6-C7 secondary to disc osteophyte bulging. PREVERTEBRAL SOFT TISSUES: No prevertebral soft tissue swelling. LUNG APICES: Severe emphysema.   OTHER FINDINGS: None.       No evidence of acute intracranial abnormality.   No acute cervical  spine fracture or malalignment.   Severe emphysema.     MACRO: None   Signed by: Nahun Espinoza 11/1/2023 8:32 PM Dictation workstation:   [id number]    XR elbow right 1-2 views    Result Date: 11/1/2023  STUDY: Elbow Radiographs; 11/1/2023 at 7:29 PM. INDICATION: Fall. COMPARISON: None available. ACCESSION NUMBER(S): [id number] ORDERING CLINICIAN: JESSE SALAZAR TECHNIQUE:  [place] view(s) of the right elbow. FINDINGS:  There is no displaced fracture.  The alignment is anatomic.  No soft tissue abnormality is seen.  There is no joint effusion.    Normal study. Signed by Patrice Beaver M.D.    XR chest 2 views    Result Date: 11/1/2023  STUDY: Chest Radiographs;  11/01/2023 at 7:29 PM INDICATION: Evaluation following a fall. COMPARISON: XR chest 09/18/23. ACCESSION NUMBER(S): [id number] ORDERING CLINICIAN: JESSE SALAZAR TECHNIQUE:  Frontal and lateral chest (two images). FINDINGS: CARDIOMEDIASTINAL SILHOUETTE: Cardiomediastinal silhouette is normal in size and configuration.  LUNGS: No regions of airspace consolidation.  No pneumothorax or significant pleural effusion.  ABDOMEN: No remarkable upper abdominal findings.  BONES: Mild degenerative changes mid and lower thoracic spine.    No pneumothorax or pleural effusion No displaced rib fracture is visualized. Limited evaluation of the lower thoracic spine due to artifact from the diaphragm. Signed by Rogelio Flaherty MD    XR pelvis 1-2 views    Result Date: 11/1/2023  STUDY: Pelvis Radiographs; 11/1/2023 at 19:29 hours INDICATION: Fall. COMPARISON: 3/24/2019 CT Abdomen and Pelvis. ACCESSION NUMBER(S): [id number] ORDERING CLINICIAN: JESSE SALAZAR TECHNIQUE:  One view(s) of the pelvis. FINDINGS:  There is joint space narrowing noted at both hips associated osteophytes.  The pelvic ring is intact.  There is no acute fracture.     1. Osteoarthritis of both hips. 2. No evidence acute traumatic sequelae. Signed by Patrice Beaver M.D.    CT CERVICAL SPINE WO IVCON    Result  Date: 10/10/2023  * * *Final Report* * * DATE OF EXAM: Oct  9 2023 11:59PM   SP[facility]   [id number]  -  CT CERVICAL SPINE WO IVCON  / ACCESSION #  [id number] PROCEDURE REASON: Spine fracture, cervical, traumatic      * * * * Physician Interpretation * * * * RESULT: EXAMINATION:  CT CERVICAL SPINE WO IVCON, CT BRAIN WO IVCON CLINICAL HISTORY: Spine fracture, cervical, traumatic (accession [id number]), Head trauma, moderate-severe (accession [id number]) TECHNIQUE:  Serial axial images without IV contrast were obtained from the vertex to the foramen magnum and through the cervical spine. MQ:  CTBWO_3 CT Dose-Length Product (DLP): 722  mGy*cm CT Dose Reduction Employed: mAs-kVp adjusted based on patient size-age (accession [id number]), No dose reduction techniques were required (accession [id number]) COMPARISON:  None. RESULT: Head CT: Scalp and Bones:  No acute soft tissue abnormality. No acute fracture or lytic or blastic lesion.  Postsurgical changes, as below. Post-operative change:  Prior left frontotemporal craniotomy for previous aneurysm clipping with left paraclinoid aneurysm clip in place. Additional dlilan hole present in the left superior parietal calvarium. Acute change:   No evidence of an acute infarct or other acute parenchymal process. Hemorrhage:  No evidence of acute intracranial hemorrhage. Mass Lesion / Mass Effect:  There is no evidence of an intracranial mass or extraaxial fluid collection.  No significant mass effect. Chronic change:   Large chronic infarct in the left MCA territory with encephalomalacia in the left frontal, middle and temporal lobes and periinsular region with likely associated wallerian degeneration along the left corticospinal tract. Calcified atherosclerosis in the carotid siphons. Parenchyma:  Disproportionate volume loss in the left cerebral hemisphere secondary to chronic infarct described above is superimposed on mild generalized parenchymal volume loss. Ventricles:   Ventricular size is  proportionate to degree of volume loss Skull base:  Skull base is intact. Middle ear cavities and mastoid air cells are grossly clear. Paranasal sinuses:  The visualized paranasal sinuses are grossly clear. Cervical spine: Counting reference: Craniocervical junction.   Anatomic Variants: None. Alignment:  Approximately 4 mm anterolisthesis of C5 on C6 as well as minimal anterolisthesis of C2 on C3, retrolisthesis of C3 on C4, anterolisthesis of C4 on C5 are most likely degenerative in etiology. Craniocervical junction:  Craniocervical junction is normal. Bones:  No fracture or lytic or blastic lesion identified. Cervical soft tissues:  No prevertebral soft tissue swelling are other gross acute soft tissue abnormality. Moderate scattered calcified atherosclerotic process. Degenerative changes:  Multilevel disc degeneration, worse/severe at C6-C7 where there is loss of disc height and mixed cystic and sclerotic degenerative endplate changes. At least mild to moderate spinal canal stenosis at C6-C7 due to a disc osteophyte complex. Advanced multilevel facet arthrosis. Varying degrees of moderate to severe multilevel foraminal stenosis due to multilevel uncovertebral and facet arthrosis Lung apices: Centrilobular paraseptal emphysema and nonspecific scarring bilaterally as well as mild pleural thickening and pleural calcification at the right lung apex.    IMPRESSION: Head CT: 1.  No acute intracranial abnormality or calvarial fracture. 2.  Postsurgical changes and chronic findings as described Cervical spine CT: 1.  No cervical spine fracture or acute traumatic subluxation. 2.  Multilevel degenerative changes. Transcribed Using Voice Recognition Transcribe Date/Time: Oct 10 2023  1:06A Dictated by: SAMMY VELÁZQUEZ MD This examination was interpreted and the report reviewed and electronically signed by: SAMMY VELÁZQUEZ MD on Oct 10 2023  1:21AM  EST    CT BRAIN WO IVCON    Result Date: 10/10/2023  * * *Final Report* * *  DATE OF EXAM: Oct  9 2023 11:59PM   [facility] [place]   0504  -  CT BRAIN WO IVCON  / ACCESSION #  [id number] PROCEDURE REASON: Head trauma, moderate-severe      * * * * Physician Interpretation * * * * RESULT: EXAMINATION:  CT CERVICAL SPINE WO IVCON, CT BRAIN WO IVCON CLINICAL HISTORY: Spine fracture, cervical, traumatic (accession [id number]), Head trauma, moderate-severe (accession [id number]) TECHNIQUE:  Serial axial images without IV contrast were obtained from the vertex to the foramen magnum and through the cervical spine. MQ:  CTBWO_3 CT Dose-Length Product (DLP): 722  mGy*cm CT Dose Reduction Employed: mAs-kVp adjusted based on patient size-age (accession [id number]), No dose reduction techniques were required (accession [id number]) COMPARISON:  None. RESULT: Head CT: Scalp and Bones:  No acute soft tissue abnormality. No acute fracture or lytic or blastic lesion.  Postsurgical changes, as below. Post-operative change:  Prior left frontotemporal craniotomy for previous aneurysm clipping with left paraclinoid aneurysm clip in place. Additional dillan hole present in the left superior parietal calvarium. Acute change:   No evidence of an acute infarct or other acute parenchymal process. Hemorrhage:  No evidence of acute intracranial hemorrhage. Mass Lesion / Mass Effect:  There is no evidence of an intracranial mass or extraaxial fluid collection.  No significant mass effect. Chronic change:   Large chronic infarct in the left MCA territory with encephalomalacia in the left frontal, middle and temporal lobes and periinsular region with likely associated wallerian degeneration along the left corticospinal tract. Calcified atherosclerosis in the carotid siphons. Parenchyma:  Disproportionate volume loss in the left cerebral hemisphere secondary to chronic infarct described above is superimposed on mild generalized parenchymal volume loss. Ventricles:   Ventricular size is proportionate to degree of volume loss Skull base:  Skull  base is intact. Middle ear cavities and mastoid air cells are grossly clear. Paranasal sinuses:  The visualized paranasal sinuses are grossly clear. Cervical spine: Counting reference: Craniocervical junction.   Anatomic Variants: None. Alignment:  Approximately 4 mm anterolisthesis of C5 on C6 as well as minimal anterolisthesis of C2 on C3, retrolisthesis of C3 on C4, anterolisthesis of C4 on C5 are most likely degenerative in etiology. Craniocervical junction:  Craniocervical junction is normal. Bones:  No fracture or lytic or blastic lesion identified. Cervical soft tissues:  No prevertebral soft tissue swelling are other gross acute soft tissue abnormality. Moderate scattered calcified atherosclerotic process. Degenerative changes:  Multilevel disc degeneration, worse/severe at C6-C7 where there is loss of disc height and mixed cystic and sclerotic degenerative endplate changes. At least mild to moderate spinal canal stenosis at C6-C7 due to a disc osteophyte complex. Advanced multilevel facet arthrosis. Varying degrees of moderate to severe multilevel foraminal stenosis due to multilevel uncovertebral and facet arthrosis Lung apices: Centrilobular paraseptal emphysema and nonspecific scarring bilaterally as well as mild pleural thickening and pleural calcification at the right lung apex.    IMPRESSION: Head CT: 1.  No acute intracranial abnormality or calvarial fracture. 2.  Postsurgical changes and chronic findings as described Cervical spine CT: 1.  No cervical spine fracture or acute traumatic subluxation. 2.  Multilevel degenerative changes. Transcribed Using Voice Recognition Transcribe Date/Time: Oct 10 2023  1:06A Dictated by: SAMMY VELÁZQUEZ MD This examination was interpreted and the report reviewed and electronically signed by: SAMMY VELÁZQUEZ MD on Oct 10 2023  1:21AM  EST    XR CHEST 1V FRONTAL PORT    Result Date: 10/10/2023  * * *Final Report* * * DATE OF EXAM: Oct  9 2023 11:05PM   SP[facility]   5376  -   XR CHEST 1V FRONTAL PORT  / ACCESSION #  [id number] PROCEDURE REASON: Mental status change      * * * * Physician Interpretation * * * * RESULT: EXAMINATION:  CHEST RADIOGRAPH (PORTABLE SINGLE VIEW AP) Exam Date/Time:  10/9/2023 11:05 PM CLINICAL HISTORY: Mental status change , fell MQ:  XCPR_5 Comparison:  Chest CT from 05/14/2023 RESULT: Lines, tubes, and devices:  None. Lungs and pleura:  Hypoventilatory lung volumes with diffuse emphysematous changes.  Slight coarseness of the parenchyma centrally at the lung bases.  No visible pleural effusion or pneumothorax. Cardiomediastinal silhouette:  Stable cardiomediastinal silhouette with atherosclerosis. Other:  No acute congestion or pneumothorax.  Severe degenerative changes of the AC joints bilaterally.  No visible displaced fracture.    IMPRESSION: 1.  Emphysematous changes with hypoventilatory lung volumes which may be creating slight coarseness of the parenchyma centrally at the lung bases.  Though infection not excluded 2.  No findings to suggest acute congestion. Transcribed Using Voice Recognition Transcribe Date/Time: Oct 10 2023 12:29A Dictated by: MARQUES ROB MD This examination was interpreted and the report reviewed and electronically signed by: MARQUES ROB MD on Oct 10 2023 12:32AM  EST            Assessment/Plan   Principal Problem:    Fall at home, initial encounter    Fall  Closed head injury  Elevated creatinine   -Witnessed fall at SNF. Patient not on any anticoagulation. Right temporal abrasion noted. After discussion with daughter, Tia, alexandra patient is at baseline mentation. A and O x 0 at this time.   -Neurovascularly intact at baseline orientation  -Vital signs stable, patient is afebrile.   -CT head no acute intracranial abnormality  -CT CS no fracture subluxation  -CXR without pneumonia, pneumothorax or pleural effusion. No widened mediastinum.  -XR right elbow, XR pelvis negative for acute abnormality  -EKG A.fib, no ST T wave changes  diagnostic for ischemia   -Cr mildly elevated from baseline    -hold nephrotoxic meds    -500 mL NS    -trend BMP  -no leukocytosis, anemia, evere electrolyte abnormalities, hypo-/hyperglycemia  -CK negative   -do not suspect ACS/AAA/dissection.    -discussed with daughter--if lab work stable, kidney function improved patient can be discharged back to facility    BPH  -continue flomax    Agitation  -continue sertraline  -zyprexa, ativan if needed   -sitter in room    DVT ddx:  -lovenox       I spent 60 minutes in the professional and overall care of this patient.  Discussed patient with daughter. Will discharge to SNF pending fluids and repeat labs.     Gonzalez Boudreaux PA-C

## 2023-11-02 NOTE — PROGRESS NOTES
[facility]     11/02/23 0840   Current Planned Discharge Disposition   Current Planned Discharge Disposition Inter

## 2023-11-02 NOTE — PROGRESS NOTES
11/02/23 0840   [facility] Disability Status   Are you deaf or do you have serious difficulty hearing? N   Are you blind or do you have serious difficulty seeing, even when wearing glasses? N   Because of a physical, mental, or emotional condition, do you have serious difficulty concentrating, remembering, or making decisions? (5 years old or older) Y   Do you have serious difficulty walking or climbing stairs? Y   Do you have serious difficulty dressing or bathing? Y   Because of a physical, mental, or emotional condition, do you have serious difficulty doing errands alone such as visiting the doctor? Y

## 2023-11-02 NOTE — PROGRESS NOTES
11/02/23 0843   Physical Activity   On average, how many days per week do you engage in moderate to strenuous exercise (like a brisk walk)? 0 days   On average, how many minutes do you engage in exercise at this level? 0 min   Financial Resource Strain   How hard is it for you to pay for the very basics like food, housing, medical care, and heating? Not hard   Housing Stability   In the last 12 months, was there a time when you were not able to pay the mortgage or rent on time? N   In the last 12 months, how many places have you lived? 1   In the last 12 months, was there a time when you did not have a steady place to sleep or slept in a shelter (including now)? N   Transportation Needs   In the past 12 months, has lack of transportation kept you from medical appointments or from getting medications? no   In the past 12 months, has lack of transportation kept you from meetings, work, or from getting things needed for daily living? No   Food Insecurity   Within the past 12 months, you worried that your food would run out before you got the money to buy more. Never true   Within the past 12 months, the food you bought just didn't last and you didn't have money to get more. Never true   Stress   Do you feel stress - tense, restless, nervous, or anxious, or unable to sleep at night because your mind is troubled all the time - these days? Not at all   Social Connections   In a typical week, how many times do you talk on the phone with family, friends, or neighbors? Never   How often do you get together with friends or relatives? Twice   How often do you attend Christian or Synagogue services? Never   Do you belong to any clubs or organizations such as Christian groups, unions, fraternal or athletic groups, or school groups? No   How often do you attend meetings of the clubs or organizations you belong to? Never   Are you , , , , never , or living with a partner?    Intimate  Partner Violence   Within the last year, have you been afraid of your partner or ex-partner? No   Within the last year, have you been humiliated or emotionally abused in other ways by your partner or ex-partner? No   Within the last year, have you been kicked, hit, slapped, or otherwise physically hurt by your partner or ex-partner? No   Within the last year, have you been raped or forced to have any kind of sexual activity by your partner or ex-partner? No   Alcohol Use   Q1: How often do you have a drink containing alcohol? Never   Q2: How many drinks containing alcohol do you have on a typical day when you are drinking? None   Q3: How often do you have six or more drinks on one occasion? Never   Utilities   In the past 12 months has the electric, gas, oil, or water company threatened to shut off services in your home? No

## 2023-11-02 NOTE — PROGRESS NOTES
Pharmacy Medication History Review    Rodri Whitfield is a 78 y.o. male admitted for No Principal Problem: There is no principal problem currently on the Problem List. Please update the Problem List and refresh.. Pharmacy reviewed the patient's akfit-pe-zcyltiuaf medications and allergies for accuracy.    The list below reflectives the updated PTA list. Please review each medication in order reconciliation for additional clarification and justification.  Prior to Admission Medications   Prescriptions Last Dose Informant Patient Reported? Taking?   LORazepam (Ativan) 1 mg tablet Past Week  Yes No   Sig: Take 1 tablet (1 mg) by mouth every 8 hours if needed for anxiety.   acetaminophen (Tylenol) 325 mg tablet Past Week  Yes No   Sig: Take 2 tablets (650 mg) by mouth every 6 hours if needed for mild pain (1 - 3).   albuterol 90 mcg/actuation inhaler Past Week  Yes No   Sig: Inhale 2 puffs every 4 hours if needed for shortness of breath.   cholecalciferol (Vitamin D-3) 25 MCG (1000 UT) tablet Past Week  Yes No   Sig: Take 1 tablet (25 mcg) by mouth once daily.   lactase (Lactaid) 3,000 unit tablet Past Week  Yes No   Sig: Take 1 tablet by mouth 3 times a day before meals.   lisinopril 10 mg tablet Past Week  Yes No   Sig: Take 1 tablet (10 mg) by mouth once daily.   loperamide (Imodium) 2 mg capsule Past Week  Yes No   Sig: Take 1 capsule (2 mg) by mouth every 8 hours if needed for diarrhea.   melatonin 10 mg tablet Past Week  Yes No   Sig: Take 1 tablet (10 mg) by mouth as needed at bedtime (FOR INSOMNIA).   nicotine polacrilex (Commit) 2 mg lozenge Past Week  Yes Yes   Sig: Take 1 lozenge (2 mg) by mouth every 4 hours if needed for smoking cessation.   ondansetron (Zofran) 4 mg tablet Past Week  Yes No   Sig: Take 1 tablet (4 mg) by mouth every 4 hours if needed for nausea or vomiting.   psyllium (Metamucil) 3.4 gram packet Past Week  Yes No   Sig: Take 1 packet by mouth once daily.   sertraline (Zoloft) 100 mg tablet  Past Week  Yes No   Sig: Take 1 tablet (100 mg) by mouth once daily. FOR DEPRESSION   tamsulosin (Flomax) 0.4 mg 24 hr capsule Past Week  Yes No   Sig: Take 1 capsule (0.4 mg) by mouth once daily at bedtime.      Facility-Administered Medications: None           The list below reflectives the updated allergy list. Please review each documented allergy for additional clarification and justification.  Allergies  Reviewed by Dewayne Fair RN on 11/1/2023   No Known Allergies         Below are additional concerns with the patient's PTA list.  Medication list sent from [facility]

## 2023-11-02 NOTE — PROGRESS NOTES
Transitional Care Coordination Progress Note:  Plan per Medical/Surgical team: treatment of fall with head laceration, on Xerato with IV fluids  Status: observation  Payor source: medicare A/B, medicaid, [place]  Discharge disposition: [facility]  Potential Barriers: dementia, frequent falls  ADOD: 11/3/2023  SANDI Corley RN, BSN Transitional Care Coordinator ED# [phone]      11/02/23 0841   Discharge Planning   Living Arrangements Alone   Support Systems Children   Assistance Needed high falls risk, dementia   Type of Residence Nursing home/residential care   Do you have animals or pets at home? No   Home or Post Acute Services Post acute facilities (Rehab/SNF/etc)   Type of Post Acute Facility Services Long term care   [facility]   Does the patient need discharge transport arranged? Yes   RoundTrip coordination needed? Yes   Has discharge transport been arranged? No   Financial Resource Strain   How hard is it for you to pay for the very basics like food, housing, medical care, and heating? Not hard   Housing Stability   In the last 12 months, was there a time when you were not able to pay the mortgage or rent on time? N   In the last 12 months, how many places have you lived? 1   In the last 12 months, was there a time when you did not have a steady place to sleep or slept in a shelter (including now)? N   Transportation Needs   In the past 12 months, has lack of transportation kept you from medical appointments or from getting medications? no   In the past 12 months, has lack of transportation kept you from meetings, work, or from getting things needed for daily living? No   Patient Choice   Provider Choice list and CMS website (https://medicare.gov/care-compare#search) for post-acute Quality and Resource Measure Data were provided and reviewed with: Family   Patient / Family choosing to utilize agency / facility established prior to hospitalization Yes

## 2023-11-03 VITALS
BODY MASS INDEX: 21.44 KG/M2 | RESPIRATION RATE: 17 BRPM | HEIGHT: 66 IN | WEIGHT: 133.38 LBS | TEMPERATURE: 98.4 F | OXYGEN SATURATION: 97 % | HEART RATE: 81 BPM | DIASTOLIC BLOOD PRESSURE: 89 MMHG | SYSTOLIC BLOOD PRESSURE: 129 MMHG

## 2023-11-03 LAB
ANION GAP SERPL CALC-SCNC: 14 MMOL/L (ref 10–20)
BUN SERPL-MCNC: 26 MG/DL (ref 6–23)
CALCIUM SERPL-MCNC: 8.8 MG/DL (ref 8.6–10.3)
CHLORIDE SERPL-SCNC: 108 MMOL/L (ref 98–107)
CO2 SERPL-SCNC: 22 MMOL/L (ref 21–32)
CREAT SERPL-MCNC: 1.05 MG/DL (ref 0.5–1.3)
ERYTHROCYTE [DISTWIDTH] IN BLOOD BY AUTOMATED COUNT: 15.2 % (ref 11.5–14.5)
GFR SERPL CREATININE-BSD FRML MDRD: 73 ML/MIN/1.73M*2
GLUCOSE SERPL-MCNC: 75 MG/DL (ref 74–99)
HCT VFR BLD AUTO: 41.9 % (ref 41–52)
HGB BLD-MCNC: 13.2 G/DL (ref 13.5–17.5)
MCH RBC QN AUTO: 29.8 PG (ref 26–34)
MCHC RBC AUTO-ENTMCNC: 31.5 G/DL (ref 32–36)
MCV RBC AUTO: 95 FL (ref 80–100)
NRBC BLD-RTO: 0 /100 WBCS (ref 0–0)
PLATELET # BLD AUTO: 247 X10*3/UL (ref 150–450)
POTASSIUM SERPL-SCNC: 3.9 MMOL/L (ref 3.5–5.3)
RBC # BLD AUTO: 4.43 X10*6/UL (ref 4.5–5.9)
SODIUM SERPL-SCNC: 140 MMOL/L (ref 136–145)
WBC # BLD AUTO: 8.2 X10*3/UL (ref 4.4–11.3)

## 2023-11-03 PROCEDURE — S0166 INJ OLANZAPINE 2.5MG: HCPCS | Performed by: EMERGENCY MEDICINE

## 2023-11-03 PROCEDURE — 85027 COMPLETE CBC AUTOMATED: CPT | Performed by: PHYSICIAN ASSISTANT

## 2023-11-03 PROCEDURE — 96372 THER/PROPH/DIAG INJ SC/IM: CPT | Performed by: PHYSICIAN ASSISTANT

## 2023-11-03 PROCEDURE — 96372 THER/PROPH/DIAG INJ SC/IM: CPT | Performed by: EMERGENCY MEDICINE

## 2023-11-03 PROCEDURE — 2500000004 HC RX 250 GENERAL PHARMACY W/ HCPCS (ALT 636 FOR OP/ED): Performed by: PHYSICIAN ASSISTANT

## 2023-11-03 PROCEDURE — 36415 COLL VENOUS BLD VENIPUNCTURE: CPT | Performed by: PHYSICIAN ASSISTANT

## 2023-11-03 PROCEDURE — 99232 SBSQ HOSP IP/OBS MODERATE 35: CPT | Performed by: NURSE PRACTITIONER

## 2023-11-03 PROCEDURE — 80048 BASIC METABOLIC PNL TOTAL CA: CPT | Performed by: PHYSICIAN ASSISTANT

## 2023-11-03 PROCEDURE — 2500000004 HC RX 250 GENERAL PHARMACY W/ HCPCS (ALT 636 FOR OP/ED): Mod: JZ | Performed by: EMERGENCY MEDICINE

## 2023-11-03 PROCEDURE — G0378 HOSPITAL OBSERVATION PER HR: HCPCS

## 2023-11-03 RX ADMIN — SERTRALINE HYDROCHLORIDE 100 MG: 50 TABLET ORAL at 08:19

## 2023-11-03 RX ADMIN — OLANZAPINE 2.5 MG: 10 INJECTION, POWDER, FOR SOLUTION INTRAMUSCULAR at 02:04

## 2023-11-03 RX ADMIN — POLYETHYLENE GLYCOL 3350 17 G: 17 POWDER, FOR SOLUTION ORAL at 08:20

## 2023-11-03 RX ADMIN — PANTOPRAZOLE SODIUM 40 MG: 40 TABLET, DELAYED RELEASE ORAL at 06:59

## 2023-11-03 RX ADMIN — ENOXAPARIN SODIUM 40 MG: 40 INJECTION SUBCUTANEOUS at 08:21

## 2023-11-03 ASSESSMENT — COGNITIVE AND FUNCTIONAL STATUS - GENERAL
MOVING TO AND FROM BED TO CHAIR: A LOT
DRESSING REGULAR LOWER BODY CLOTHING: A LITTLE
DAILY ACTIVITIY SCORE: 16
TOILETING: TOTAL
DRESSING REGULAR UPPER BODY CLOTHING: A LITTLE
STANDING UP FROM CHAIR USING ARMS: A LOT
CLIMB 3 TO 5 STEPS WITH RAILING: A LOT
EATING MEALS: A LITTLE
MOVING FROM LYING ON BACK TO SITTING ON SIDE OF FLAT BED WITH BEDRAILS: A LOT
WALKING IN HOSPITAL ROOM: A LOT
MOBILITY SCORE: 14
HELP NEEDED FOR BATHING: A LITTLE
PERSONAL GROOMING: A LITTLE

## 2023-11-03 NOTE — DISCHARGE SUMMARY
Discharge Diagnosis  Fall at home, initial encounter    Issues Requiring Follow-Up  Vascular dementia  CVA with residual deficit  HTN  A.fib  Immobility    Discharge Meds     Your medication list        CONTINUE taking these medications        Instructions Last Dose Given Next Dose Due   acetaminophen 325 mg tablet  Commonly known as: Tylenol           albuterol 90 mcg/actuation inhaler           cholecalciferol 25 MCG (1000 UT) tablet  Commonly known as: Vitamin D-3           LACTASE ORAL           lisinopril 10 mg tablet           loperamide 2 mg capsule  Commonly known as: Imodium           LORazepam 1 mg tablet  Commonly known as: Ativan           melatonin 10 mg tablet           nicotine polacrilex 2 mg lozenge  Commonly known as: Commit           ondansetron 4 mg tablet  Commonly known as: Zofran           psyllium 3.4 gram packet  Commonly known as: Metamucil           sertraline 100 mg tablet  Commonly known as: Zoloft           tamsulosin 0.4 mg 24 hr capsule  Commonly known as: Flomax                    Test Results Pending At Discharge  Pending Labs       Order Current Status    Potassium In process    Urine Culture In process            Hospital Course   Patient remained HDS throughout hospital stay.    Fall  Closed head injury  Elevated creatinine   -Witnessed fall at SNF. Patient not on any anticoagulation. Right temporal abrasion noted. After discussion with daughter, Tia, alexandra patient is at baseline mentation. A and O x 0 at this time.   -Neurovascularly intact at baseline orientation  -Vital signs stable, patient is afebrile.   -CT head no acute intracranial abnormality  -CT CS no fracture subluxation  -CXR without pneumonia, pneumothorax or pleural effusion. No widened mediastinum.  -XR right elbow, XR pelvis negative for acute abnormality  -EKG A.fib, no ST T wave changes diagnostic for ischemia   -Cr mildly elevated from baseline    -hold nephrotoxic meds    -500 mL NS    -trend BMP  -no  leukocytosis, anemia, evere electrolyte abnormalities, hypo-/hyperglycemia  -CK negative   -do not suspect ACS/AAA/dissection.    -discussed with daughter--if lab work stable, kidney function improved patient can be discharged back to facility    BPH  -continue flomax     Agitation  -continue sertraline  -zyprexa, ativan if needed   -sitter in room    Discharge:  Kidney function improved. To be discharged to facility. Discussed with daughterTia. Will need transport arranged.    Pertinent Physical Exam At Time of Discharge  Physical Exam  Constitutional:       General: He is not in acute distress.     Appearance: He is not toxic-appearing.   HENT:      Head: Normocephalic.      Comments: Right temporal laceration noted  Cardiovascular:      Rate and Rhythm: Normal rate. Rhythm irregular.   Pulmonary:      Breath sounds: No wheezing or rales.   Abdominal:      General: Abdomen is flat.      Palpations: Abdomen is soft.      Tenderness: There is no abdominal tenderness. There is no guarding or rebound.   Musculoskeletal:         General: No swelling or tenderness.      Right lower leg: No edema.      Left lower leg: No edema.   Neurological:      Mental Status: Mental status is at baseline.      Comments: At baseline neurological function         Outpatient Follow-Up  No future appointments.      Gonzalez Boudreaux PA-C

## 2023-11-03 NOTE — PROGRESS NOTES
Attempted to call daughterTia to let her know that her father will be returning to [facility] today but her vm is full.

## 2023-11-03 NOTE — PROGRESS NOTES
"Rodri Whitfield is a 78 y.o. male on day 0 of admission presenting with Fall at home, initial encounter.    Subjective   Sitter at bedside.  Was supposed to be discharged yesterday, however due to being late in the evening unable to get transport.  Plan for discharge today       Objective     Physical Exam  Cardiovascular:      Rate and Rhythm: Normal rate. Rhythm irregular.      Heart sounds: Normal heart sounds. No murmur heard.     No friction rub. No gallop.   Pulmonary:      Effort: Pulmonary effort is normal. No respiratory distress.      Breath sounds: Normal breath sounds. No wheezing or rhonchi.   Abdominal:      General: Abdomen is flat. There is no distension.      Palpations: Abdomen is soft.      Tenderness: There is no abdominal tenderness. There is no guarding.   Neurological:      Mental Status: He is alert.         Last Recorded Vitals  Blood pressure 129/89, pulse 81, temperature 36.9 °C (98.4 °F), temperature source Temporal, resp. rate 17, height 1.676 m (5' 6\"), weight 60.5 kg (133 lb 6.1 oz), SpO2 97 %.  Intake/Output last 3 Shifts:  I/O last 3 completed shifts:  In: 1000 (16.5 mL/kg) [IV Piggyback:1000]  Out: - (0 mL/kg)   Weight: 60.5 kg     Relevant Results    Scheduled medications  enoxaparin, 40 mg, subcutaneous, Daily  [Held by provider] lisinopril, 10 mg, oral, Daily  pantoprazole, 40 mg, oral, Daily before breakfast   Or  pantoprazole, 40 mg, intravenous, Daily before breakfast  polyethylene glycol, 17 g, oral, Daily  sertraline, 100 mg, oral, Daily  tamsulosin, 0.4 mg, oral, Nightly      Continuous medications     PRN medications  PRN medications: acetaminophen **OR** acetaminophen **OR** acetaminophen, albuterol, LORazepam, melatonin, OLANZapine, ondansetron ODT **OR** ondansetron    Results for orders placed or performed during the hospital encounter of 11/01/23 (from the past 24 hour(s))   CBC   Result Value Ref Range    WBC 10.1 4.4 - 11.3 x10*3/uL    nRBC 0.0 0.0 - 0.0 /100 WBCs    " RBC 4.86 4.50 - 5.90 x10*6/uL    Hemoglobin 14.5 13.5 - 17.5 g/dL    Hematocrit 46.3 41.0 - 52.0 %    MCV 95 80 - 100 fL    MCH 29.8 26.0 - 34.0 pg    MCHC 31.3 (L) 32.0 - 36.0 g/dL    RDW 15.3 (H) 11.5 - 14.5 %    Platelets 281 150 - 450 x10*3/uL   Basic metabolic panel   Result Value Ref Range    Glucose 166 (H) 74 - 99 mg/dL    Sodium 140 136 - 145 mmol/L    Potassium 5.8 (H) 3.5 - 5.3 mmol/L    Chloride 109 (H) 98 - 107 mmol/L    Bicarbonate 22 21 - 32 mmol/L    Anion Gap 15 10 - 20 mmol/L    Urea Nitrogen 29 (H) 6 - 23 mg/dL    Creatinine 1.15 0.50 - 1.30 mg/dL    eGFR 65 >60 mL/min/1.73m*2    Calcium 9.2 8.6 - 10.3 mg/dL   Troponin I, High Sensitivity   Result Value Ref Range    Troponin I, High Sensitivity 10 0 - 20 ng/L   Potassium   Result Value Ref Range    Potassium 4.9 3.5 - 5.3 mmol/L   CBC   Result Value Ref Range    WBC 8.2 4.4 - 11.3 x10*3/uL    nRBC 0.0 0.0 - 0.0 /100 WBCs    RBC 4.43 (L) 4.50 - 5.90 x10*6/uL    Hemoglobin 13.2 (L) 13.5 - 17.5 g/dL    Hematocrit 41.9 41.0 - 52.0 %    MCV 95 80 - 100 fL    MCH 29.8 26.0 - 34.0 pg    MCHC 31.5 (L) 32.0 - 36.0 g/dL    RDW 15.2 (H) 11.5 - 14.5 %    Platelets 247 150 - 450 x10*3/uL   Basic metabolic panel   Result Value Ref Range    Glucose 75 74 - 99 mg/dL    Sodium 140 136 - 145 mmol/L    Potassium 3.9 3.5 - 5.3 mmol/L    Chloride 108 (H) 98 - 107 mmol/L    Bicarbonate 22 21 - 32 mmol/L    Anion Gap 14 10 - 20 mmol/L    Urea Nitrogen 26 (H) 6 - 23 mg/dL    Creatinine 1.05 0.50 - 1.30 mg/dL    eGFR 73 >60 mL/min/1.73m*2    Calcium 8.8 8.6 - 10.3 mg/dL       CT cervical spine wo IV contrast    Result Date: 11/1/2023  Interpreted By:  Nahun Espinoza, STUDY: CT HEAD WO IV CONTRAST; CT CERVICAL SPINE WO IV CONTRAST; ; 11/1/2023 8:22 pm   INDICATION: fall, right abrasion; Signs/Symptoms:fall, hx of dementia, unable to give good hx.   COMPARISON: Noncontrast CT exams of head and cervical spine of 09/18/2023.   ACCESSION NUMBER(S): [id number]; [id number]    ORDERING CLINICIAN: JESSE SALAZAR   TECHNIQUE: Noncontrast CT exams of the head and cervical spine with multiplanar reformations.   FINDINGS: BRAIN PARENCHYMA: Advanced volume loss.  There is periventricular and subcortical white matter hypoattenuation, most in keeping with chronic microvascular ischemic change. Extensive left cerebral hemispheric encephalomalacia, as before. Multifocal hypodensities in cerebellar hemispheres and left basal ganglia region in keeping with chronic lacunar infarcts, as before. Gray-white matter interfaces are preserved. No mass, mass effect or midline shift.   HEMORRHAGE: No acute intracranial hemorrhage. VENTRICLES and EXTRA-AXIAL SPACES: Normal size. EXTRACRANIAL SOFT TISSUES: Within normal limits. PARANASAL SINUSES/MASTOIDS: The visualized paranasal sinuses and mastoid air cells are aerated. CALVARIUM: No depressed skull fracture. No destructive osseous lesion. Left craniotomy changes again seen.   OTHER FINDINGS: None.   CERVICAL SPINE:   ALIGNMENT: Mild grade 1 degenerative anterolisthesis at C4-C5 and C5-C6 and C7-T1, unchanged. Alignment is otherwise normal. VERTEBRAE: No acute fracture. Vertebral stature is maintained. Degenerative endplate osteophytosis and uncovertebral hypertrophy in conjunction with degenerative disc height loss, worst at C6-C7, where it is severe in degree. Moderate to severe multilevel hypertrophic facet osteoarthropathy, worse on the right side. SPINAL CANAL: No critical spinal canal stenosis. Moderate canal narrowing at C6-C7 secondary to disc osteophyte bulging. PREVERTEBRAL SOFT TISSUES: No prevertebral soft tissue swelling. LUNG APICES: Severe emphysema.   OTHER FINDINGS: None.       No evidence of acute intracranial abnormality.   No acute cervical spine fracture or malalignment.   Severe emphysema.     MACRO: None   Signed by: Nahun Espinoza 11/1/2023 8:32 PM Dictation workstation:   [id number]    CT head wo IV contrast    Result Date:  11/1/2023  Interpreted By:  Nahun Espinoza, STUDY: CT HEAD WO IV CONTRAST; CT CERVICAL SPINE WO IV CONTRAST; ; 11/1/2023 8:22 pm   INDICATION: fall, right abrasion; Signs/Symptoms:fall, hx of dementia, unable to give good hx.   COMPARISON: Noncontrast CT exams of head and cervical spine of 09/18/2023.   ACCESSION NUMBER(S): [id number]; [id number]   ORDERING CLINICIAN: JESSE SALAZAR   TECHNIQUE: Noncontrast CT exams of the head and cervical spine with multiplanar reformations.   FINDINGS: BRAIN PARENCHYMA: Advanced volume loss.  There is periventricular and subcortical white matter hypoattenuation, most in keeping with chronic microvascular ischemic change. Extensive left cerebral hemispheric encephalomalacia, as before. Multifocal hypodensities in cerebellar hemispheres and left basal ganglia region in keeping with chronic lacunar infarcts, as before. Gray-white matter interfaces are preserved. No mass, mass effect or midline shift.   HEMORRHAGE: No acute intracranial hemorrhage. VENTRICLES and EXTRA-AXIAL SPACES: Normal size. EXTRACRANIAL SOFT TISSUES: Within normal limits. PARANASAL SINUSES/MASTOIDS: The visualized paranasal sinuses and mastoid air cells are aerated. CALVARIUM: No depressed skull fracture. No destructive osseous lesion. Left craniotomy changes again seen.   OTHER FINDINGS: None.   CERVICAL SPINE:   ALIGNMENT: Mild grade 1 degenerative anterolisthesis at C4-C5 and C5-C6 and C7-T1, unchanged. Alignment is otherwise normal. VERTEBRAE: No acute fracture. Vertebral stature is maintained. Degenerative endplate osteophytosis and uncovertebral hypertrophy in conjunction with degenerative disc height loss, worst at C6-C7, where it is severe in degree. Moderate to severe multilevel hypertrophic facet osteoarthropathy, worse on the right side. SPINAL CANAL: No critical spinal canal stenosis. Moderate canal narrowing at C6-C7 secondary to disc osteophyte bulging. PREVERTEBRAL SOFT TISSUES: No  prevertebral soft tissue swelling. LUNG APICES: Severe emphysema.   OTHER FINDINGS: None.       No evidence of acute intracranial abnormality.   No acute cervical spine fracture or malalignment.   Severe emphysema.     MACRO: None   Signed by: Nahun Espinoza 11/1/2023 8:32 PM Dictation workstation:   [id number]    XR elbow right 1-2 views    Result Date: 11/1/2023  STUDY: Elbow Radiographs; 11/1/2023 at 7:29 PM. INDICATION: Fall. COMPARISON: None available. ACCESSION NUMBER(S): [id number] ORDERING CLINICIAN: JESSE SALAZAR TECHNIQUE:  [place] view(s) of the right elbow. FINDINGS:  There is no displaced fracture.  The alignment is anatomic.  No soft tissue abnormality is seen.  There is no joint effusion.    Normal study. Signed by Patrice Beaver M.D.    XR chest 2 views    Result Date: 11/1/2023  STUDY: Chest Radiographs;  11/01/2023 at 7:29 PM INDICATION: Evaluation following a fall. COMPARISON: XR chest 09/18/23. ACCESSION NUMBER(S): [id number] ORDERING CLINICIAN: JESSE SALAZAR TECHNIQUE:  Frontal and lateral chest (two images). FINDINGS: CARDIOMEDIASTINAL SILHOUETTE: Cardiomediastinal silhouette is normal in size and configuration.  LUNGS: No regions of airspace consolidation.  No pneumothorax or significant pleural effusion.  ABDOMEN: No remarkable upper abdominal findings.  BONES: Mild degenerative changes mid and lower thoracic spine.    No pneumothorax or pleural effusion No displaced rib fracture is visualized. Limited evaluation of the lower thoracic spine due to artifact from the diaphragm. Signed by Rogelio Flaherty MD    XR pelvis 1-2 views    Result Date: 11/1/2023  STUDY: Pelvis Radiographs; 11/1/2023 at 19:29 hours INDICATION: Fall. COMPARISON: 3/24/2019 CT Abdomen and Pelvis. ACCESSION NUMBER(S): [id number] ORDERING CLINICIAN: JESSE SALAZAR TECHNIQUE:  One view(s) of the pelvis. FINDINGS:  There is joint space narrowing noted at both hips associated osteophytes.  The pelvic ring is intact.  There is no  acute fracture.     1. Osteoarthritis of both hips. 2. No evidence acute traumatic sequelae. Signed by Patrice Beaver M.D.    CT CERVICAL SPINE WO IVCON    Result Date: 10/10/2023  * * *Final Report* * * DATE OF EXAM: Oct  9 2023 11:59PM   [facility] [place] [facility] [id number]  [place]  CT CERVICAL SPINE WO IVCON  / ACCESSION #  [id number] PROCEDURE REASON: Spine fracture, cervical, traumatic      * * * * Physician Interpretation * * * * RESULT: EXAMINATION:  CT CERVICAL SPINE WO IVCON, CT BRAIN WO IVCON CLINICAL HISTORY: Spine fracture, cervical, traumatic (accession [id number]), Head trauma, moderate-severe (accession [id number]) TECHNIQUE:  Serial axial images without IV contrast were obtained from the vertex to the foramen magnum and through the cervical spine. MQ:  CTBWO_3 CT Dose-Length Product (DLP): 722  mGy*cm CT Dose Reduction Employed: mAs-kVp adjusted based on patient size-age (accession [id number]), No dose reduction techniques were required (accession [id number]) COMPARISON:  None. RESULT: Head CT: Scalp and Bones:  No acute soft tissue abnormality. No acute fracture or lytic or blastic lesion.  Postsurgical changes, as below. Post-operative change:  Prior left frontotemporal craniotomy for previous aneurysm clipping with left paraclinoid aneurysm clip in place. Additional dillan hole present in the left superior parietal calvarium. Acute change:   No evidence of an acute infarct or other acute parenchymal process. Hemorrhage:  No evidence of acute intracranial hemorrhage. Mass Lesion / Mass Effect:  There is no evidence of an intracranial mass or extraaxial fluid collection.  No significant mass effect. Chronic change:   Large chronic infarct in the left MCA territory with encephalomalacia in the left frontal, middle and temporal lobes and periinsular region with likely associated wallerian degeneration along the left corticospinal tract. Calcified atherosclerosis in the carotid siphons. Parenchyma:  Disproportionate volume loss in the left  cerebral hemisphere secondary to chronic infarct described above is superimposed on mild generalized parenchymal volume loss. Ventricles:   Ventricular size is proportionate to degree of volume loss Skull base:  Skull base is intact. Middle ear cavities and mastoid air cells are grossly clear. Paranasal sinuses:  The visualized paranasal sinuses are grossly clear. Cervical spine: Counting reference: Craniocervical junction.   Anatomic Variants: None. Alignment:  Approximately 4 mm anterolisthesis of C5 on C6 as well as minimal anterolisthesis of C2 on C3, retrolisthesis of C3 on C4, anterolisthesis of C4 on C5 are most likely degenerative in etiology. Craniocervical junction:  Craniocervical junction is normal. Bones:  No fracture or lytic or blastic lesion identified. Cervical soft tissues:  No prevertebral soft tissue swelling are other gross acute soft tissue abnormality. Moderate scattered calcified atherosclerotic process. Degenerative changes:  Multilevel disc degeneration, worse/severe at C6-C7 where there is loss of disc height and mixed cystic and sclerotic degenerative endplate changes. At least mild to moderate spinal canal stenosis at C6-C7 due to a disc osteophyte complex. Advanced multilevel facet arthrosis. Varying degrees of moderate to severe multilevel foraminal stenosis due to multilevel uncovertebral and facet arthrosis Lung apices: Centrilobular paraseptal emphysema and nonspecific scarring bilaterally as well as mild pleural thickening and pleural calcification at the right lung apex.    IMPRESSION: Head CT: 1.  No acute intracranial abnormality or calvarial fracture. 2.  Postsurgical changes and chronic findings as described Cervical spine CT: 1.  No cervical spine fracture or acute traumatic subluxation. 2.  Multilevel degenerative changes. Transcribed Using Voice Recognition Transcribe Date/Time: Oct 10 2023  1:06A Dictated by: SAMMY VELÁZQUEZ MD This examination was interpreted and the  report reviewed and electronically signed by: SAMMY VELÁZQUEZ MD on Oct 10 2023  1:21AM  EST    CT BRAIN WO IVCON    Result Date: 10/10/2023  * * *Final Report* * * DATE OF EXAM: Oct  9 2023 11:59PM   [facility] [place]   [id number]  -  CT BRAIN WO IVCON  / ACCESSION #  [id number] PROCEDURE REASON: Head trauma, moderate-severe      * * * * Physician Interpretation * * * * RESULT: EXAMINATION:  CT CERVICAL SPINE WO IVCON, CT BRAIN WO IVCON CLINICAL HISTORY: Spine fracture, cervical, traumatic (accession [id number]), Head trauma, moderate-severe (accession [id number]) TECHNIQUE:  Serial axial images without IV contrast were obtained from the vertex to the foramen magnum and through the cervical spine. MQ:  CTBWO_3 CT Dose-Length Product (DLP): 722  mGy*cm CT Dose Reduction Employed: mAs-kVp adjusted based on patient size-age (accession [id number]), No dose reduction techniques were required (accession [id number]) COMPARISON:  None. RESULT: Head CT: Scalp and Bones:  No acute soft tissue abnormality. No acute fracture or lytic or blastic lesion.  Postsurgical changes, as below. Post-operative change:  Prior left frontotemporal craniotomy for previous aneurysm clipping with left paraclinoid aneurysm clip in place. Additional dillan hole present in the left superior parietal calvarium. Acute change:   No evidence of an acute infarct or other acute parenchymal process. Hemorrhage:  No evidence of acute intracranial hemorrhage. Mass Lesion / Mass Effect:  There is no evidence of an intracranial mass or extraaxial fluid collection.  No significant mass effect. Chronic change:   Large chronic infarct in the left MCA territory with encephalomalacia in the left frontal, middle and temporal lobes and periinsular region with likely associated wallerian degeneration along the left corticospinal tract. Calcified atherosclerosis in the carotid siphons. Parenchyma:  Disproportionate volume loss in the left cerebral hemisphere secondary to chronic infarct  described above is superimposed on mild generalized parenchymal volume loss. Ventricles:   Ventricular size is proportionate to degree of volume loss Skull base:  Skull base is intact. Middle ear cavities and mastoid air cells are grossly clear. Paranasal sinuses:  The visualized paranasal sinuses are grossly clear. Cervical spine: Counting reference: Craniocervical junction.   Anatomic Variants: None. Alignment:  Approximately 4 mm anterolisthesis of C5 on C6 as well as minimal anterolisthesis of C2 on C3, retrolisthesis of C3 on C4, anterolisthesis of C4 on C5 are most likely degenerative in etiology. Craniocervical junction:  Craniocervical junction is normal. Bones:  No fracture or lytic or blastic lesion identified. Cervical soft tissues:  No prevertebral soft tissue swelling are other gross acute soft tissue abnormality. Moderate scattered calcified atherosclerotic process. Degenerative changes:  Multilevel disc degeneration, worse/severe at C6-C7 where there is loss of disc height and mixed cystic and sclerotic degenerative endplate changes. At least mild to moderate spinal canal stenosis at C6-C7 due to a disc osteophyte complex. Advanced multilevel facet arthrosis. Varying degrees of moderate to severe multilevel foraminal stenosis due to multilevel uncovertebral and facet arthrosis Lung apices: Centrilobular paraseptal emphysema and nonspecific scarring bilaterally as well as mild pleural thickening and pleural calcification at the right lung apex.    IMPRESSION: Head CT: 1.  No acute intracranial abnormality or calvarial fracture. 2.  Postsurgical changes and chronic findings as described Cervical spine CT: 1.  No cervical spine fracture or acute traumatic subluxation. 2.  Multilevel degenerative changes. Transcribed Using Voice Recognition Transcribe Date/Time: Oct 10 2023  1:06A Dictated by: SAMMY VELÁZQUEZ MD This examination was interpreted and the report reviewed and electronically signed by: SAMMY  MD EBER on Oct 10 2023  1:21AM  EST    XR CHEST 1V FRONTAL PORT    Result Date: 10/10/2023  * * *Final Report* * * DATE OF EXAM: Oct  9 2023 11:05PM   [facility]   [id number]  -  XR CHEST 1V FRONTAL PORT  / ACCESSION #  [id number] PROCEDURE REASON: Mental status change      * * * * Physician Interpretation * * * * RESULT: EXAMINATION:  CHEST RADIOGRAPH (PORTABLE SINGLE VIEW AP) Exam Date/Time:  10/9/2023 11:05 PM CLINICAL HISTORY: Mental status change , fell MQ:  XCPR_5 Comparison:  Chest CT from 05/14/2023 RESULT: Lines, tubes, and devices:  None. Lungs and pleura:  Hypoventilatory lung volumes with diffuse emphysematous changes.  Slight coarseness of the parenchyma centrally at the lung bases.  No visible pleural effusion or pneumothorax. Cardiomediastinal silhouette:  Stable cardiomediastinal silhouette with atherosclerosis. Other:  No acute congestion or pneumothorax.  Severe degenerative changes of the AC joints bilaterally.  No visible displaced fracture.    IMPRESSION: 1.  Emphysematous changes with hypoventilatory lung volumes which may be creating slight coarseness of the parenchyma centrally at the lung bases.  Though infection not excluded 2.  No findings to suggest acute congestion. Transcribed Using Voice Recognition Transcribe Date/Time: Oct 10 2023 12:29A Dictated by: MARQUES ROB MD This examination was interpreted and the report reviewed and electronically signed by: MARQUES ROB MD on Oct 10 2023 12:32AM  EST                   Assessment/Plan   Principal Problem:    Fall at home, initial encounter    Fall  Closed head injury  Elevated creatinine   -Witnessed fall at SNF. Patient not on any anticoagulation. Right temporal abrasion noted. After discussion with daughter, Tia, appears patient is at baseline mentation. A and O x 0 at this time.   -Neurovascularly intact at baseline orientation  -Vital signs stable, patient is afebrile.   -CT head no acute intracranial abnormality  -CT CS no fracture  subluxation  -CXR without pneumonia, pneumothorax or pleural effusion. No widened mediastinum.  -XR right elbow, XR pelvis negative for acute abnormality  -EKG A.fib, no ST T wave changes diagnostic for ischemia   -Cr mildly elevated from baseline    -hold nephrotoxic meds    -500 mL NS    -trend BMP  -no leukocytosis, anemia, evere electrolyte abnormalities, hypo-/hyperglycemia  -CK negative   -do not suspect ACS/AAA/dissection.    -discussed with daughter--if lab work stable, kidney function improved patient can be discharged back to facility    BPH  -continue flomax     Agitation  -continue sertraline  -zyprexa, ativan if needed   -sitter in room     Discharge:  Kidney function improved. To be discharged to facility. Will need transport arranged.  To be discharged today as it is too late for discharge last night      I spent 45  minutes in the professional and overall care of this patient.      Breanne Shafer, APRN-CNP

## 2023-11-03 NOTE — NURSING NOTE
Pt to be discharged back to [facility] today. Potassium level back stable at 3.9. Called [facility] Chest nut [facility] gave a updated report. Waiting on transportation to be set up

## 2023-11-03 NOTE — CARE PLAN
The patient's goals for the shift include continue education    The clinical goals for the shift include pt will remain free of falls    Over the shift, the patient did not make progress toward the following goals. Barriers to progression include confusion. Recommendations to address these barriers include continued education.

## 2023-11-04 LAB — BACTERIA UR CULT: ABNORMAL

## 2023-12-11 ENCOUNTER — APPOINTMENT (OUTPATIENT)
Dept: RADIOLOGY | Facility: HOSPITAL | Age: 78
End: 2023-12-11
Payer: MEDICARE

## 2023-12-11 ENCOUNTER — HOSPITAL ENCOUNTER (EMERGENCY)
Facility: HOSPITAL | Age: 78
Discharge: HOME | End: 2023-12-11
Attending: EMERGENCY MEDICINE
Payer: MEDICARE

## 2023-12-11 VITALS
HEART RATE: 99 BPM | SYSTOLIC BLOOD PRESSURE: 137 MMHG | TEMPERATURE: 97.7 F | HEIGHT: 66 IN | OXYGEN SATURATION: 98 % | DIASTOLIC BLOOD PRESSURE: 98 MMHG | BODY MASS INDEX: 20.89 KG/M2 | RESPIRATION RATE: 18 BRPM | WEIGHT: 130 LBS

## 2023-12-11 DIAGNOSIS — R29.6 RECURRENT FALLS: ICD-10-CM

## 2023-12-11 DIAGNOSIS — S09.90XA CLOSED HEAD INJURY, INITIAL ENCOUNTER: Primary | ICD-10-CM

## 2023-12-11 DIAGNOSIS — R41.82 AMS (ALTERED MENTAL STATUS): ICD-10-CM

## 2023-12-11 LAB
ALBUMIN SERPL BCP-MCNC: 3.6 G/DL (ref 3.4–5)
ALP SERPL-CCNC: 76 U/L (ref 33–136)
ALT SERPL W P-5'-P-CCNC: 8 U/L (ref 10–52)
ANION GAP SERPL CALC-SCNC: 11 MMOL/L (ref 10–20)
APTT PPP: 31 SECONDS (ref 27–38)
AST SERPL W P-5'-P-CCNC: 12 U/L (ref 9–39)
BILIRUB SERPL-MCNC: 0.3 MG/DL (ref 0–1.2)
BUN SERPL-MCNC: 25 MG/DL (ref 6–23)
CALCIUM SERPL-MCNC: 9.1 MG/DL (ref 8.6–10.3)
CHLORIDE SERPL-SCNC: 107 MMOL/L (ref 98–107)
CO2 SERPL-SCNC: 26 MMOL/L (ref 21–32)
CREAT SERPL-MCNC: 1.01 MG/DL (ref 0.5–1.3)
ERYTHROCYTE [DISTWIDTH] IN BLOOD BY AUTOMATED COUNT: 14 % (ref 11.5–14.5)
GFR SERPL CREATININE-BSD FRML MDRD: 76 ML/MIN/1.73M*2
GLUCOSE SERPL-MCNC: 95 MG/DL (ref 74–99)
HCT VFR BLD AUTO: 41.7 % (ref 41–52)
HGB BLD-MCNC: 13.3 G/DL (ref 13.5–17.5)
INR PPP: 1 (ref 0.9–1.1)
MCH RBC QN AUTO: 30.3 PG (ref 26–34)
MCHC RBC AUTO-ENTMCNC: 31.9 G/DL (ref 32–36)
MCV RBC AUTO: 95 FL (ref 80–100)
NRBC BLD-RTO: 0 /100 WBCS (ref 0–0)
PLATELET # BLD AUTO: 223 X10*3/UL (ref 150–450)
POTASSIUM SERPL-SCNC: 4.1 MMOL/L (ref 3.5–5.3)
PROT SERPL-MCNC: 6.2 G/DL (ref 6.4–8.2)
PROTHROMBIN TIME: 11.2 SECONDS (ref 9.8–12.8)
RBC # BLD AUTO: 4.39 X10*6/UL (ref 4.5–5.9)
SODIUM SERPL-SCNC: 140 MMOL/L (ref 136–145)
WBC # BLD AUTO: 6.5 X10*3/UL (ref 4.4–11.3)

## 2023-12-11 PROCEDURE — 72125 CT NECK SPINE W/O DYE: CPT | Performed by: RADIOLOGY

## 2023-12-11 PROCEDURE — 72125 CT NECK SPINE W/O DYE: CPT

## 2023-12-11 PROCEDURE — 36415 COLL VENOUS BLD VENIPUNCTURE: CPT | Performed by: EMERGENCY MEDICINE

## 2023-12-11 PROCEDURE — 99285 EMERGENCY DEPT VISIT HI MDM: CPT | Mod: 25 | Performed by: EMERGENCY MEDICINE

## 2023-12-11 PROCEDURE — 80053 COMPREHEN METABOLIC PANEL: CPT | Performed by: EMERGENCY MEDICINE

## 2023-12-11 PROCEDURE — 70450 CT HEAD/BRAIN W/O DYE: CPT | Performed by: RADIOLOGY

## 2023-12-11 PROCEDURE — 70450 CT HEAD/BRAIN W/O DYE: CPT

## 2023-12-11 PROCEDURE — 85027 COMPLETE CBC AUTOMATED: CPT | Performed by: EMERGENCY MEDICINE

## 2023-12-11 PROCEDURE — 85610 PROTHROMBIN TIME: CPT | Performed by: EMERGENCY MEDICINE

## 2023-12-11 NOTE — ED TRIAGE NOTES
To ED via [facility] from [facility], pt fell around 0330 this am from standing. Nursing staff states no LOC. Pt has skin tear/hematoma to right forehead. Pt has dementia. He is alert to himself currently, and that is reported to be baseline. 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 He was medicated with 0.5mg ativan at the facility at 0100 this am.     Pt does follow commands appropriately.       DNRCC per nursing home paperwork.

## 2023-12-12 NOTE — ED PROVIDER NOTES
HPI   Chief Complaint   Patient presents with    Fall    Head Injury       Patient history of dementia.  Oriented to self only.  Not conversational.  History limited apparently unwitnessed fall at his nursing facility.      History limited by:  Age and dementia                      Kody Coma Scale Score: 15                  Patient History   Past Medical History:   Diagnosis Date    Personal history of other diseases of the circulatory system     History of hypertension    Personal history of other diseases of the circulatory system 06/04/2018    History of atrial fibrillation    Personal history of other endocrine, nutritional and metabolic disease     History of hyperlipidemia    Personal history of other medical treatment     History of cardioversion    Unspecified atrial fibrillation (CMS/HCC)     Atrial fibrillation status post cardioversion     Past Surgical History:   Procedure Laterality Date    MR HEAD ANGIO WO IV CONTRAST  2/25/2023    MR HEAD ANGIO WO IV CONTRAST PAR MRI    MR NECK ANGIO WO IV CONTRAST  2/25/2023    MR NECK ANGIO WO IV CONTRAST PAR MRI     No family history on file.  Social History     Tobacco Use    Smoking status: Unknown    Smokeless tobacco: Not on file   Vaping Use    Vaping Use: Unknown   Substance Use Topics    Alcohol use: Not on file    Drug use: Not on file     Comment: unable to understand       Physical Exam   ED Triage Vitals [12/11/23 0418]   Temp Heart Rate Resp BP   36.5 °C (97.7 °F) 89 20 (!) 152/92      SpO2 Temp Source Heart Rate Source Patient Position   98 % Oral -- --      BP Location FiO2 (%)     -- --       Physical Exam  Vitals and nursing note reviewed.   Constitutional:       General: He is not in acute distress.     Appearance: He is well-developed.   HENT:      Head:      Comments: Large right forehead contusion  Eyes:      Conjunctiva/sclera: Conjunctivae normal.   Cardiovascular:      Rate and Rhythm: Normal rate and regular rhythm.      Heart sounds: No  murmur heard.  Pulmonary:      Effort: Pulmonary effort is normal. No respiratory distress.      Breath sounds: Normal breath sounds.   Abdominal:      Palpations: Abdomen is soft.      Tenderness: There is no abdominal tenderness.   Musculoskeletal:         General: No swelling.      Cervical back: Neck supple.   Skin:     General: Skin is warm and dry.      Capillary Refill: Capillary refill takes less than 2 seconds.   Neurological:      Mental Status: He is alert.   Psychiatric:         Mood and Affect: Mood normal.         ED Course & MDM   Diagnoses as of 12/12/23 1000   Closed head injury, initial encounter   Recurrent falls       Medical Decision Making  Patient has a negative CT head.  Negative basic lab screening and EKG.    EKG interpreted by myself.  Normal intervals.  A-fib at a rate of 85 bpm.  No signs of acute.    Procedure  Procedures     Mal Barnett MD  12/12/23 1012

## 2023-12-21 ENCOUNTER — HOSPITAL ENCOUNTER (INPATIENT)
Facility: HOSPITAL | Age: 78
LOS: 3 days | Discharge: NURSING, REHAB OR LONG-TERM CARE | DRG: 194 | End: 2023-12-24
Attending: STUDENT IN AN ORGANIZED HEALTH CARE EDUCATION/TRAINING PROGRAM | Admitting: HOSPITALIST
Payer: MEDICARE

## 2023-12-21 ENCOUNTER — APPOINTMENT (OUTPATIENT)
Dept: RADIOLOGY | Facility: HOSPITAL | Age: 78
DRG: 194 | End: 2023-12-21
Payer: MEDICARE

## 2023-12-21 ENCOUNTER — APPOINTMENT (OUTPATIENT)
Dept: CARDIOLOGY | Facility: HOSPITAL | Age: 78
DRG: 194 | End: 2023-12-21
Payer: MEDICARE

## 2023-12-21 DIAGNOSIS — N17.9 AKI (ACUTE KIDNEY INJURY) (CMS-HCC): ICD-10-CM

## 2023-12-21 DIAGNOSIS — J18.9 PNEUMONIA OF LEFT LOWER LOBE DUE TO INFECTIOUS ORGANISM: Primary | ICD-10-CM

## 2023-12-21 LAB
ALBUMIN SERPL BCP-MCNC: 4 G/DL (ref 3.4–5)
ALP SERPL-CCNC: 83 U/L (ref 33–136)
ALT SERPL W P-5'-P-CCNC: 10 U/L (ref 10–52)
ANION GAP SERPL CALC-SCNC: 15 MMOL/L (ref 10–20)
AST SERPL W P-5'-P-CCNC: 15 U/L (ref 9–39)
BASOPHILS # BLD AUTO: 0.04 X10*3/UL (ref 0–0.1)
BASOPHILS NFR BLD AUTO: 0.3 %
BILIRUB SERPL-MCNC: 0.8 MG/DL (ref 0–1.2)
BUN SERPL-MCNC: 45 MG/DL (ref 6–23)
CALCIUM SERPL-MCNC: 9.6 MG/DL (ref 8.6–10.3)
CARDIAC TROPONIN I PNL SERPL HS: 15 NG/L (ref 0–20)
CARDIAC TROPONIN I PNL SERPL HS: 17 NG/L (ref 0–20)
CHLORIDE SERPL-SCNC: 105 MMOL/L (ref 98–107)
CO2 SERPL-SCNC: 24 MMOL/L (ref 21–32)
CREAT SERPL-MCNC: 1.59 MG/DL (ref 0.5–1.3)
EOSINOPHIL # BLD AUTO: 0 X10*3/UL (ref 0–0.4)
EOSINOPHIL NFR BLD AUTO: 0 %
ERYTHROCYTE [DISTWIDTH] IN BLOOD BY AUTOMATED COUNT: 14.2 % (ref 11.5–14.5)
FLUAV RNA RESP QL NAA+PROBE: NOT DETECTED
FLUBV RNA RESP QL NAA+PROBE: NOT DETECTED
GFR SERPL CREATININE-BSD FRML MDRD: 44 ML/MIN/1.73M*2
GLUCOSE SERPL-MCNC: 100 MG/DL (ref 74–99)
HCT VFR BLD AUTO: 38.4 % (ref 41–52)
HGB BLD-MCNC: 12.4 G/DL (ref 13.5–17.5)
IMM GRANULOCYTES # BLD AUTO: 0.05 X10*3/UL (ref 0–0.5)
IMM GRANULOCYTES NFR BLD AUTO: 0.4 % (ref 0–0.9)
LYMPHOCYTES # BLD AUTO: 0.72 X10*3/UL (ref 0.8–3)
LYMPHOCYTES NFR BLD AUTO: 5.6 %
MCH RBC QN AUTO: 30.4 PG (ref 26–34)
MCHC RBC AUTO-ENTMCNC: 32.3 G/DL (ref 32–36)
MCV RBC AUTO: 94 FL (ref 80–100)
MONOCYTES # BLD AUTO: 0.62 X10*3/UL (ref 0.05–0.8)
MONOCYTES NFR BLD AUTO: 4.8 %
NEUTROPHILS # BLD AUTO: 11.45 X10*3/UL (ref 1.6–5.5)
NEUTROPHILS NFR BLD AUTO: 88.9 %
NRBC BLD-RTO: 0 /100 WBCS (ref 0–0)
PLATELET # BLD AUTO: 225 X10*3/UL (ref 150–450)
POTASSIUM SERPL-SCNC: 5.1 MMOL/L (ref 3.5–5.3)
PROT SERPL-MCNC: 7.2 G/DL (ref 6.4–8.2)
RBC # BLD AUTO: 4.08 X10*6/UL (ref 4.5–5.9)
SARS-COV-2 RNA RESP QL NAA+PROBE: NOT DETECTED
SODIUM SERPL-SCNC: 139 MMOL/L (ref 136–145)
WBC # BLD AUTO: 12.9 X10*3/UL (ref 4.4–11.3)

## 2023-12-21 PROCEDURE — 96374 THER/PROPH/DIAG INJ IV PUSH: CPT | Mod: 59

## 2023-12-21 PROCEDURE — 99222 1ST HOSP IP/OBS MODERATE 55: CPT | Performed by: HOSPITALIST

## 2023-12-21 PROCEDURE — 71045 X-RAY EXAM CHEST 1 VIEW: CPT | Performed by: RADIOLOGY

## 2023-12-21 PROCEDURE — 84484 ASSAY OF TROPONIN QUANT: CPT | Performed by: STUDENT IN AN ORGANIZED HEALTH CARE EDUCATION/TRAINING PROGRAM

## 2023-12-21 PROCEDURE — 99285 EMERGENCY DEPT VISIT HI MDM: CPT | Performed by: STUDENT IN AN ORGANIZED HEALTH CARE EDUCATION/TRAINING PROGRAM

## 2023-12-21 PROCEDURE — 1100000001 HC PRIVATE ROOM DAILY

## 2023-12-21 PROCEDURE — 93005 ELECTROCARDIOGRAM TRACING: CPT

## 2023-12-21 PROCEDURE — 2500000004 HC RX 250 GENERAL PHARMACY W/ HCPCS (ALT 636 FOR OP/ED): Performed by: STUDENT IN AN ORGANIZED HEALTH CARE EDUCATION/TRAINING PROGRAM

## 2023-12-21 PROCEDURE — 80053 COMPREHEN METABOLIC PANEL: CPT | Performed by: STUDENT IN AN ORGANIZED HEALTH CARE EDUCATION/TRAINING PROGRAM

## 2023-12-21 PROCEDURE — 2500000004 HC RX 250 GENERAL PHARMACY W/ HCPCS (ALT 636 FOR OP/ED): Performed by: HOSPITALIST

## 2023-12-21 PROCEDURE — 85025 COMPLETE CBC W/AUTO DIFF WBC: CPT | Performed by: STUDENT IN AN ORGANIZED HEALTH CARE EDUCATION/TRAINING PROGRAM

## 2023-12-21 PROCEDURE — 96372 THER/PROPH/DIAG INJ SC/IM: CPT | Performed by: HOSPITALIST

## 2023-12-21 PROCEDURE — 87636 SARSCOV2 & INF A&B AMP PRB: CPT | Performed by: STUDENT IN AN ORGANIZED HEALTH CARE EDUCATION/TRAINING PROGRAM

## 2023-12-21 PROCEDURE — 71045 X-RAY EXAM CHEST 1 VIEW: CPT

## 2023-12-21 PROCEDURE — 36415 COLL VENOUS BLD VENIPUNCTURE: CPT | Performed by: STUDENT IN AN ORGANIZED HEALTH CARE EDUCATION/TRAINING PROGRAM

## 2023-12-21 PROCEDURE — 2500000001 HC RX 250 WO HCPCS SELF ADMINISTERED DRUGS (ALT 637 FOR MEDICARE OP): Performed by: HOSPITALIST

## 2023-12-21 RX ORDER — PANTOPRAZOLE SODIUM 40 MG/10ML
40 INJECTION, POWDER, LYOPHILIZED, FOR SOLUTION INTRAVENOUS
Status: DISCONTINUED | OUTPATIENT
Start: 2023-12-22 | End: 2023-12-25 | Stop reason: HOSPADM

## 2023-12-21 RX ORDER — ONDANSETRON 4 MG/1
4 TABLET, ORALLY DISINTEGRATING ORAL EVERY 8 HOURS PRN
Status: DISCONTINUED | OUTPATIENT
Start: 2023-12-21 | End: 2023-12-25 | Stop reason: HOSPADM

## 2023-12-21 RX ORDER — ACETAMINOPHEN 650 MG/1
650 SUPPOSITORY RECTAL EVERY 4 HOURS PRN
Status: DISCONTINUED | OUTPATIENT
Start: 2023-12-21 | End: 2023-12-25 | Stop reason: HOSPADM

## 2023-12-21 RX ORDER — TALC
9 POWDER (GRAM) TOPICAL NIGHTLY PRN
Status: DISCONTINUED | OUTPATIENT
Start: 2023-12-21 | End: 2023-12-25 | Stop reason: HOSPADM

## 2023-12-21 RX ORDER — ACETAMINOPHEN 325 MG/1
650 TABLET ORAL EVERY 4 HOURS PRN
Status: DISCONTINUED | OUTPATIENT
Start: 2023-12-21 | End: 2023-12-25 | Stop reason: HOSPADM

## 2023-12-21 RX ORDER — ACETAMINOPHEN 160 MG/5ML
650 SOLUTION ORAL EVERY 4 HOURS PRN
Status: DISCONTINUED | OUTPATIENT
Start: 2023-12-21 | End: 2023-12-25 | Stop reason: HOSPADM

## 2023-12-21 RX ORDER — TAMSULOSIN HYDROCHLORIDE 0.4 MG/1
0.4 CAPSULE ORAL NIGHTLY
Status: DISCONTINUED | OUTPATIENT
Start: 2023-12-21 | End: 2023-12-25 | Stop reason: HOSPADM

## 2023-12-21 RX ORDER — MICONAZOLE NITRATE 2 %
2 CREAM (GRAM) TOPICAL EVERY 4 HOURS PRN
Status: DISCONTINUED | OUTPATIENT
Start: 2023-12-21 | End: 2023-12-25 | Stop reason: HOSPADM

## 2023-12-21 RX ORDER — POLYETHYLENE GLYCOL 3350 17 G/17G
17 POWDER, FOR SOLUTION ORAL DAILY
Status: DISCONTINUED | OUTPATIENT
Start: 2023-12-22 | End: 2023-12-25 | Stop reason: HOSPADM

## 2023-12-21 RX ORDER — ATORVASTATIN CALCIUM 40 MG/1
40 TABLET, FILM COATED ORAL NIGHTLY
Status: DISCONTINUED | OUTPATIENT
Start: 2023-12-21 | End: 2023-12-25 | Stop reason: HOSPADM

## 2023-12-21 RX ORDER — SODIUM CHLORIDE 9 MG/ML
75 INJECTION, SOLUTION INTRAVENOUS CONTINUOUS
Status: DISCONTINUED | OUTPATIENT
Start: 2023-12-21 | End: 2023-12-24

## 2023-12-21 RX ORDER — ONDANSETRON HYDROCHLORIDE 2 MG/ML
4 INJECTION, SOLUTION INTRAVENOUS EVERY 8 HOURS PRN
Status: DISCONTINUED | OUTPATIENT
Start: 2023-12-21 | End: 2023-12-25 | Stop reason: HOSPADM

## 2023-12-21 RX ORDER — LISINOPRIL 10 MG/1
10 TABLET ORAL
Status: DISCONTINUED | OUTPATIENT
Start: 2023-12-22 | End: 2023-12-21

## 2023-12-21 RX ORDER — PANTOPRAZOLE SODIUM 40 MG/1
40 TABLET, DELAYED RELEASE ORAL
Status: DISCONTINUED | OUTPATIENT
Start: 2023-12-22 | End: 2023-12-25 | Stop reason: HOSPADM

## 2023-12-21 RX ORDER — CHOLECALCIFEROL (VITAMIN D3) 25 MCG
1000 TABLET ORAL DAILY
Status: DISCONTINUED | OUTPATIENT
Start: 2023-12-22 | End: 2023-12-25 | Stop reason: HOSPADM

## 2023-12-21 RX ORDER — CHOLECALCIFEROL (VITAMIN D3) 125 MCG
3000 CAPSULE ORAL
Status: DISCONTINUED | OUTPATIENT
Start: 2023-12-22 | End: 2023-12-25 | Stop reason: HOSPADM

## 2023-12-21 RX ORDER — SERTRALINE HYDROCHLORIDE 50 MG/1
100 TABLET, FILM COATED ORAL
Status: DISCONTINUED | OUTPATIENT
Start: 2023-12-22 | End: 2023-12-25 | Stop reason: HOSPADM

## 2023-12-21 RX ORDER — ENOXAPARIN SODIUM 100 MG/ML
40 INJECTION SUBCUTANEOUS EVERY 24 HOURS
Status: DISCONTINUED | OUTPATIENT
Start: 2023-12-21 | End: 2023-12-25 | Stop reason: HOSPADM

## 2023-12-21 RX ADMIN — ACETAMINOPHEN 650 MG: 325 TABLET ORAL at 22:26

## 2023-12-21 RX ADMIN — ENOXAPARIN SODIUM 40 MG: 40 INJECTION SUBCUTANEOUS at 22:26

## 2023-12-21 RX ADMIN — Medication 9 MG: at 22:27

## 2023-12-21 RX ADMIN — AZITHROMYCIN MONOHYDRATE 500 MG: 500 INJECTION, POWDER, LYOPHILIZED, FOR SOLUTION INTRAVENOUS at 20:31

## 2023-12-21 RX ADMIN — CEFTRIAXONE 2 G: 2 INJECTION, POWDER, FOR SOLUTION INTRAMUSCULAR; INTRAVENOUS at 20:03

## 2023-12-21 RX ADMIN — SODIUM CHLORIDE 75 ML/HR: 9 INJECTION, SOLUTION INTRAVENOUS at 22:26

## 2023-12-21 RX ADMIN — TAMSULOSIN HYDROCHLORIDE 0.4 MG: 0.4 CAPSULE ORAL at 22:27

## 2023-12-21 RX ADMIN — ATORVASTATIN CALCIUM 40 MG: 40 TABLET, FILM COATED ORAL at 22:27

## 2023-12-21 RX ADMIN — SODIUM CHLORIDE 1000 ML: 9 INJECTION, SOLUTION INTRAVENOUS at 19:15

## 2023-12-21 ASSESSMENT — COLUMBIA-SUICIDE SEVERITY RATING SCALE - C-SSRS
6. HAVE YOU EVER DONE ANYTHING, STARTED TO DO ANYTHING, OR PREPARED TO DO ANYTHING TO END YOUR LIFE?: NO
1. IN THE PAST MONTH, HAVE YOU WISHED YOU WERE DEAD OR WISHED YOU COULD GO TO SLEEP AND NOT WAKE UP?: NO
2. HAVE YOU ACTUALLY HAD ANY THOUGHTS OF KILLING YOURSELF?: NO

## 2023-12-21 ASSESSMENT — PAIN - FUNCTIONAL ASSESSMENT: PAIN_FUNCTIONAL_ASSESSMENT: 0-10

## 2023-12-21 ASSESSMENT — PAIN SCALES - GENERAL
PAINLEVEL_OUTOF10: 5 - MODERATE PAIN
PAINLEVEL_OUTOF10: 0 - NO PAIN

## 2023-12-21 NOTE — Clinical Note
ED UM Review Complete - Patient Meets Inpatient Criteria  @[facility]USER@

## 2023-12-22 ENCOUNTER — APPOINTMENT (OUTPATIENT)
Dept: CARDIOLOGY | Facility: HOSPITAL | Age: 78
DRG: 194 | End: 2023-12-22
Payer: MEDICARE

## 2023-12-22 LAB
ANION GAP SERPL CALC-SCNC: 13 MMOL/L (ref 10–20)
BASOPHILS # BLD AUTO: 0.03 X10*3/UL (ref 0–0.1)
BASOPHILS NFR BLD AUTO: 0.3 %
BUN SERPL-MCNC: 38 MG/DL (ref 6–23)
CALCIUM SERPL-MCNC: 8.8 MG/DL (ref 8.6–10.3)
CHLORIDE SERPL-SCNC: 110 MMOL/L (ref 98–107)
CO2 SERPL-SCNC: 22 MMOL/L (ref 21–32)
CREAT SERPL-MCNC: 1.16 MG/DL (ref 0.5–1.3)
EOSINOPHIL # BLD AUTO: 0.08 X10*3/UL (ref 0–0.4)
EOSINOPHIL NFR BLD AUTO: 0.9 %
ERYTHROCYTE [DISTWIDTH] IN BLOOD BY AUTOMATED COUNT: 14 % (ref 11.5–14.5)
GFR SERPL CREATININE-BSD FRML MDRD: 64 ML/MIN/1.73M*2
GLUCOSE SERPL-MCNC: 88 MG/DL (ref 74–99)
HCT VFR BLD AUTO: 38.5 % (ref 41–52)
HGB BLD-MCNC: 12 G/DL (ref 13.5–17.5)
IMM GRANULOCYTES # BLD AUTO: 0.03 X10*3/UL (ref 0–0.5)
IMM GRANULOCYTES NFR BLD AUTO: 0.3 % (ref 0–0.9)
LYMPHOCYTES # BLD AUTO: 1.3 X10*3/UL (ref 0.8–3)
LYMPHOCYTES NFR BLD AUTO: 14.9 %
MAGNESIUM SERPL-MCNC: 2 MG/DL (ref 1.6–2.4)
MCH RBC QN AUTO: 30.1 PG (ref 26–34)
MCHC RBC AUTO-ENTMCNC: 31.2 G/DL (ref 32–36)
MCV RBC AUTO: 97 FL (ref 80–100)
MONOCYTES # BLD AUTO: 0.66 X10*3/UL (ref 0.05–0.8)
MONOCYTES NFR BLD AUTO: 7.6 %
NEUTROPHILS # BLD AUTO: 6.61 X10*3/UL (ref 1.6–5.5)
NEUTROPHILS NFR BLD AUTO: 76 %
NRBC BLD-RTO: 0 /100 WBCS (ref 0–0)
PLATELET # BLD AUTO: 205 X10*3/UL (ref 150–450)
POTASSIUM SERPL-SCNC: 3.9 MMOL/L (ref 3.5–5.3)
RBC # BLD AUTO: 3.99 X10*6/UL (ref 4.5–5.9)
SODIUM SERPL-SCNC: 141 MMOL/L (ref 136–145)
WBC # BLD AUTO: 8.7 X10*3/UL (ref 4.4–11.3)

## 2023-12-22 PROCEDURE — 2500000001 HC RX 250 WO HCPCS SELF ADMINISTERED DRUGS (ALT 637 FOR MEDICARE OP): Performed by: HOSPITALIST

## 2023-12-22 PROCEDURE — 2500000004 HC RX 250 GENERAL PHARMACY W/ HCPCS (ALT 636 FOR OP/ED): Performed by: HOSPITALIST

## 2023-12-22 PROCEDURE — 80048 BASIC METABOLIC PNL TOTAL CA: CPT | Performed by: HOSPITALIST

## 2023-12-22 PROCEDURE — 99233 SBSQ HOSP IP/OBS HIGH 50: CPT | Performed by: INTERNAL MEDICINE

## 2023-12-22 PROCEDURE — 1100000001 HC PRIVATE ROOM DAILY

## 2023-12-22 PROCEDURE — 83735 ASSAY OF MAGNESIUM: CPT | Performed by: HOSPITALIST

## 2023-12-22 PROCEDURE — 85025 COMPLETE CBC W/AUTO DIFF WBC: CPT | Performed by: HOSPITALIST

## 2023-12-22 PROCEDURE — 2500000004 HC RX 250 GENERAL PHARMACY W/ HCPCS (ALT 636 FOR OP/ED): Performed by: INTERNAL MEDICINE

## 2023-12-22 PROCEDURE — 96372 THER/PROPH/DIAG INJ SC/IM: CPT | Performed by: HOSPITALIST

## 2023-12-22 PROCEDURE — 93005 ELECTROCARDIOGRAM TRACING: CPT

## 2023-12-22 PROCEDURE — 36415 COLL VENOUS BLD VENIPUNCTURE: CPT | Performed by: HOSPITALIST

## 2023-12-22 RX ORDER — GABAPENTIN 100 MG/1
100 CAPSULE ORAL 2 TIMES DAILY
COMMUNITY

## 2023-12-22 RX ORDER — CEFTRIAXONE 1 G/50ML
1 INJECTION, SOLUTION INTRAVENOUS EVERY 24 HOURS
Status: DISCONTINUED | OUTPATIENT
Start: 2023-12-22 | End: 2023-12-25 | Stop reason: HOSPADM

## 2023-12-22 RX ADMIN — SODIUM CHLORIDE 75 ML/HR: 9 INJECTION, SOLUTION INTRAVENOUS at 09:37

## 2023-12-22 RX ADMIN — Medication 3000 UNITS: at 09:13

## 2023-12-22 RX ADMIN — POLYETHYLENE GLYCOL 3350 17 G: 17 POWDER, FOR SOLUTION ORAL at 09:13

## 2023-12-22 RX ADMIN — ATORVASTATIN CALCIUM 40 MG: 40 TABLET, FILM COATED ORAL at 20:25

## 2023-12-22 RX ADMIN — Medication 3000 UNITS: at 15:28

## 2023-12-22 RX ADMIN — SERTRALINE HYDROCHLORIDE 100 MG: 50 TABLET ORAL at 06:36

## 2023-12-22 RX ADMIN — PANTOPRAZOLE SODIUM 40 MG: 40 TABLET, DELAYED RELEASE ORAL at 06:36

## 2023-12-22 RX ADMIN — Medication 3000 UNITS: at 12:40

## 2023-12-22 RX ADMIN — TAMSULOSIN HYDROCHLORIDE 0.4 MG: 0.4 CAPSULE ORAL at 20:26

## 2023-12-22 RX ADMIN — AZITHROMYCIN MONOHYDRATE 500 MG: 500 INJECTION, POWDER, LYOPHILIZED, FOR SOLUTION INTRAVENOUS at 20:26

## 2023-12-22 RX ADMIN — Medication 1000 UNITS: at 09:13

## 2023-12-22 RX ADMIN — PSYLLIUM HUSK 1 PACKET: 3.4 POWDER ORAL at 09:13

## 2023-12-22 RX ADMIN — CEFTRIAXONE SODIUM 1 G: 1 INJECTION, SOLUTION INTRAVENOUS at 22:13

## 2023-12-22 RX ADMIN — ACETAMINOPHEN 650 MG: 325 TABLET ORAL at 20:26

## 2023-12-22 RX ADMIN — ENOXAPARIN SODIUM 40 MG: 40 INJECTION SUBCUTANEOUS at 21:45

## 2023-12-22 SDOH — SOCIAL STABILITY: SOCIAL INSECURITY: DO YOU FEEL ANYONE HAS EXPLOITED OR TAKEN ADVANTAGE OF YOU FINANCIALLY OR OF YOUR PERSONAL PROPERTY?: NO

## 2023-12-22 SDOH — SOCIAL STABILITY: SOCIAL INSECURITY: DOES ANYONE TRY TO KEEP YOU FROM HAVING/CONTACTING OTHER FRIENDS OR DOING THINGS OUTSIDE YOUR HOME?: NO

## 2023-12-22 SDOH — SOCIAL STABILITY: SOCIAL INSECURITY: WERE YOU ABLE TO COMPLETE ALL THE BEHAVIORAL HEALTH SCREENINGS?: YES

## 2023-12-22 SDOH — SOCIAL STABILITY: SOCIAL INSECURITY: ARE YOU OR HAVE YOU BEEN THREATENED OR ABUSED PHYSICALLY, EMOTIONALLY, OR SEXUALLY BY ANYONE?: NO

## 2023-12-22 SDOH — SOCIAL STABILITY: SOCIAL INSECURITY: HAS ANYONE EVER THREATENED TO HURT YOUR FAMILY OR YOUR PETS?: NO

## 2023-12-22 SDOH — SOCIAL STABILITY: SOCIAL INSECURITY: ABUSE: ADULT

## 2023-12-22 SDOH — SOCIAL STABILITY: SOCIAL INSECURITY: HAVE YOU HAD THOUGHTS OF HARMING ANYONE ELSE?: NO

## 2023-12-22 SDOH — SOCIAL STABILITY: SOCIAL INSECURITY: ARE THERE ANY APPARENT SIGNS OF INJURIES/BEHAVIORS THAT COULD BE RELATED TO ABUSE/NEGLECT?: NO

## 2023-12-22 SDOH — SOCIAL STABILITY: SOCIAL INSECURITY: DO YOU FEEL UNSAFE GOING BACK TO THE PLACE WHERE YOU ARE LIVING?: NO

## 2023-12-22 ASSESSMENT — ACTIVITIES OF DAILY LIVING (ADL)
TOILETING: NEEDS ASSISTANCE
ASSISTIVE_DEVICE: WALKER
JUDGMENT_ADEQUATE_SAFELY_COMPLETE_DAILY_ACTIVITIES: NO
WALKS IN HOME: NEEDS ASSISTANCE
HEARING - LEFT EAR: FUNCTIONAL
ADEQUATE_TO_COMPLETE_ADL: YES
PATIENT'S MEMORY ADEQUATE TO SAFELY COMPLETE DAILY ACTIVITIES?: UNABLE TO ASSESS
GROOMING: NEEDS ASSISTANCE
FEEDING YOURSELF: NEEDS ASSISTANCE
HEARING - RIGHT EAR: FUNCTIONAL
BATHING: NEEDS ASSISTANCE
DRESSING YOURSELF: NEEDS ASSISTANCE

## 2023-12-22 ASSESSMENT — PAIN SCALES - PAIN ASSESSMENT IN ADVANCED DEMENTIA (PAINAD)
BODYLANGUAGE: RELAXED
TOTALSCORE: 0
TOTALSCORE: 0
BREATHING: NORMAL
BREATHING: NORMAL
CONSOLABILITY: NO NEED TO CONSOLE
FACIALEXPRESSION: SMILING OR INEXPRESSIVE
FACIALEXPRESSION: SMILING OR INEXPRESSIVE
CONSOLABILITY: NO NEED TO CONSOLE
BREATHING: NORMAL
CONSOLABILITY: NO NEED TO CONSOLE
BODYLANGUAGE: RELAXED
BODYLANGUAGE: RELAXED
FACIALEXPRESSION: SMILING OR INEXPRESSIVE
TOTALSCORE: 0

## 2023-12-22 ASSESSMENT — PAIN - FUNCTIONAL ASSESSMENT
PAIN_FUNCTIONAL_ASSESSMENT: PAINAD (PAIN ASSESSMENT IN ADVANCED DEMENTIA SCALE)

## 2023-12-22 ASSESSMENT — LIFESTYLE VARIABLES
AUDIT-C TOTAL SCORE: 0
PRESCIPTION_ABUSE_PAST_12_MONTHS: NO
HOW OFTEN DO YOU HAVE A DRINK CONTAINING ALCOHOL: NEVER
HOW MANY STANDARD DRINKS CONTAINING ALCOHOL DO YOU HAVE ON A TYPICAL DAY: PATIENT DOES NOT DRINK
AUDIT-C TOTAL SCORE: 0
SKIP TO QUESTIONS 9-10: 1
HOW OFTEN DO YOU HAVE 6 OR MORE DRINKS ON ONE OCCASION: NEVER
SUBSTANCE_ABUSE_PAST_12_MONTHS: NO

## 2023-12-22 ASSESSMENT — COGNITIVE AND FUNCTIONAL STATUS - GENERAL
PERSONAL GROOMING: A LITTLE
TOILETING: A LITTLE
WALKING IN HOSPITAL ROOM: A LITTLE
PATIENT BASELINE BEDBOUND: NO
STANDING UP FROM CHAIR USING ARMS: A LITTLE
TURNING FROM BACK TO SIDE WHILE IN FLAT BAD: A LITTLE
DAILY ACTIVITIY SCORE: 18
DRESSING REGULAR UPPER BODY CLOTHING: A LITTLE
MOVING FROM LYING ON BACK TO SITTING ON SIDE OF FLAT BED WITH BEDRAILS: A LITTLE
DRESSING REGULAR LOWER BODY CLOTHING: A LITTLE
MOVING TO AND FROM BED TO CHAIR: A LITTLE
MOBILITY SCORE: 18
HELP NEEDED FOR BATHING: A LITTLE
EATING MEALS: A LITTLE
CLIMB 3 TO 5 STEPS WITH RAILING: A LITTLE

## 2023-12-22 ASSESSMENT — PATIENT HEALTH QUESTIONNAIRE - PHQ9
SUM OF ALL RESPONSES TO PHQ9 QUESTIONS 1 & 2: 0
2. FEELING DOWN, DEPRESSED OR HOPELESS: NOT AT ALL
1. LITTLE INTEREST OR PLEASURE IN DOING THINGS: NOT AT ALL

## 2023-12-22 ASSESSMENT — PAIN SCALES - GENERAL: PAINLEVEL_OUTOF10: 0 - NO PAIN

## 2023-12-22 NOTE — H&P
"History Of Present Illness  Rodri Whitfield is a 78 y.o. male  with a  PMH of CVA with residual aphasia, right sided weakness, HTN, BPH, HLD, vascular dementia, who was sent to the ED with worsening cough.  Pt resides at [facility].   Mr Whitfield is unable to give any history and there is nobody present with him to provide any.     In the ED, his WBC ct was found to be elevated at 12.9, Cr 1.59 (baseline 1), CXR shows worsening left basilar opacity    Pt was started on abx in the ED.       Past Medical History  Past Medical History:   Diagnosis Date    Personal history of other diseases of the circulatory system     History of hypertension    Personal history of other diseases of the circulatory system 06/04/2018    History of atrial fibrillation    Personal history of other endocrine, nutritional and metabolic disease     History of hyperlipidemia    Personal history of other medical treatment     History of cardioversion    Unspecified atrial fibrillation (CMS/HCC)     Atrial fibrillation status post cardioversion       Surgical History  Past Surgical History:   Procedure Laterality Date    MR HEAD ANGIO WO IV CONTRAST  2/25/2023    MR HEAD ANGIO WO IV CONTRAST PAR MRI    MR NECK ANGIO WO IV CONTRAST  2/25/2023    MR NECK ANGIO WO IV CONTRAST PAR MRI        Social History  He has no history on file for tobacco use, alcohol use, and drug use.    Family History  No family history on file.     Allergies  Patient has no known allergies.    Review of Systems   Unable to perform ROS: Other (pt is aphasic, can only say a few words)        Physical Exam  Vitals reviewed.   Constitutional:       Appearance: Normal appearance.      Comments: Pleasant elderly white male, able to follow some commands. Seems to be able to speak only a few words, such as \"woody-woody\", [place].    HENT:      Head: Normocephalic and atraumatic.      Mouth/Throat:      Mouth: Mucous membranes are moist.   Eyes:      Extraocular Movements: " "Extraocular movements intact.      Conjunctiva/sclera: Conjunctivae normal.      Pupils: Pupils are equal, round, and reactive to light.   Cardiovascular:      Rate and Rhythm: Normal rate and regular rhythm.      Pulses: Normal pulses.      Heart sounds: Normal heart sounds.   Pulmonary:      Effort: Pulmonary effort is normal.      Breath sounds: Normal breath sounds.      Comments: Lungs are clear, however he doesn't follow instructions to take deep breaths.   Abdominal:      General: Abdomen is flat. Bowel sounds are normal.      Palpations: Abdomen is soft.   Musculoskeletal:         General: Normal range of motion.   Skin:     General: Skin is warm and dry.   Neurological:      Mental Status: He is alert.      Comments: Cannot accurately access.   Is able to follow commands with his upper extremities, doesn't move either lower extremity with instruction.   Cannot assess orientation due to his aphasia.    Psychiatric:         Behavior: Behavior normal.      Comments: Cannot assess.           Last Recorded Vitals  Blood pressure (!) 128/93, pulse (!) 114, temperature 36.1 °C (97 °F), resp. rate 18, height 1.727 m (5' 8\"), weight 59 kg (130 lb), SpO2 95 %.    Relevant Results        Results for orders placed or performed during the hospital encounter of 12/21/23 (from the past 24 hour(s))   Comprehensive Metabolic Panel   Result Value Ref Range    Glucose 100 (H) 74 - 99 mg/dL    Sodium 139 136 - 145 mmol/L    Potassium 5.1 3.5 - 5.3 mmol/L    Chloride 105 98 - 107 mmol/L    Bicarbonate 24 21 - 32 mmol/L    Anion Gap 15 10 - 20 mmol/L    Urea Nitrogen 45 (H) 6 - 23 mg/dL    Creatinine 1.59 (H) 0.50 - 1.30 mg/dL    eGFR 44 (L) >60 mL/min/1.73m*2    Calcium 9.6 8.6 - 10.3 mg/dL    Albumin 4.0 3.4 - 5.0 g/dL    Alkaline Phosphatase 83 33 - 136 U/L    Total Protein 7.2 6.4 - 8.2 g/dL    AST 15 9 - 39 U/L    Bilirubin, Total 0.8 0.0 - 1.2 mg/dL    ALT 10 10 - 52 U/L   CBC and Auto Differential   Result Value Ref Range "    WBC 12.9 (H) 4.4 - 11.3 x10*3/uL    nRBC 0.0 0.0 - 0.0 /100 WBCs    RBC 4.08 (L) 4.50 - 5.90 x10*6/uL    Hemoglobin 12.4 (L) 13.5 - 17.5 g/dL    Hematocrit 38.4 (L) 41.0 - 52.0 %    MCV 94 80 - 100 fL    MCH 30.4 26.0 - 34.0 pg    MCHC 32.3 32.0 - 36.0 g/dL    RDW 14.2 11.5 - 14.5 %    Platelets 225 150 - 450 x10*3/uL    Neutrophils % 88.9 40.0 - 80.0 %    Immature Granulocytes %, Automated 0.4 0.0 - 0.9 %    Lymphocytes % 5.6 13.0 - 44.0 %    Monocytes % 4.8 2.0 - 10.0 %    Eosinophils % 0.0 0.0 - 6.0 %    Basophils % 0.3 0.0 - 2.0 %    Neutrophils Absolute 11.45 (H) 1.60 - 5.50 x10*3/uL    Immature Granulocytes Absolute, Automated 0.05 0.00 - 0.50 x10*3/uL    Lymphocytes Absolute 0.72 (L) 0.80 - 3.00 x10*3/uL    Monocytes Absolute 0.62 0.05 - 0.80 x10*3/uL    Eosinophils Absolute 0.00 0.00 - 0.40 x10*3/uL    Basophils Absolute 0.04 0.00 - 0.10 x10*3/uL   Sars-CoV-2 and Influenza A/B PCR   Result Value Ref Range    Flu A Result Not Detected Not Detected    Flu B Result Not Detected Not Detected    Coronavirus 2019, PCR Not Detected Not Detected   Troponin I, High Sensitivity, Initial   Result Value Ref Range    Troponin I, High Sensitivity 17 0 - 20 ng/L   Troponin, High Sensitivity, 1 Hour   Result Value Ref Range    Troponin I, High Sensitivity 15 0 - 20 ng/L     XR chest 1 view    Result Date: 12/21/2023  Interpreted By:  Cora Murcia, STUDY: XR CHEST 1 VIEW;  12/21/2023 6:37 pm   INDICATION: Signs/Symptoms:cough.   COMPARISON: 11/01/2023   ACCESSION NUMBER(S): [id number]   ORDERING CLINICIAN: FERNANDO LORI   FINDINGS: Worsening left basilar opacity suspicious for pneumonia in the appropriate clinical setting. Scattered reticular opacities in the lungs bilaterally, favored chronic. No pleural effusion or pneumothorax. Normal heart size. No acute osseous abnormality. Atherosclerosis.       Worsening left basilar opacity suggestive of pneumonia in the appropriate clinical setting. Recommend follow-up  transfer resolution.   Signed by: Cora Murcia 12/21/2023 7:03 PM Dictation workstation:   [id number]    CT head wo IV contrast    Result Date: 12/11/2023  Interpreted By:  Finkelstein, Evan, STUDY: CT HEAD WO IV CONTRAST; CT CERVICAL SPINE WO IV CONTRAST;  12/11/2023 4:55 am   INDICATION: fall head trauma; Signs/Symptoms:fall head trauma.   COMPARISON: CT brain and cervical spine 11/01/2023   ACCESSION NUMBER(S): [id number]; [id number]   ORDERING CLINICIAN: HEMANTH LAINEZ   TECHNIQUE: Noncontrast CT images of the head with coronal and sagittal reconstructions. Axial noncontrast CT images of the cervical spine with coronal and sagittal reconstructed images.   FINDINGS: EXTRACRANIAL SOFT TISSUES: Frontal scalp swelling.   CALVARIUM: No depressed skull fracture. No destructive osseous lesion. Left side craniotomy changes again seen.   PARANASAL SINUSES/MASTOIDS: The visualized paranasal sinuses and mastoid air cells are aerated.   HEMORRHAGE: No acute intracranial hemorrhage.   BRAIN PARENCHYMA: Gray-white matter interfaces are preserved. No mass effect or midline shift. Large area of encephalomalacia again seen in the left hemisphere. Additional nonspecific scattered white matter hypodensities are present. Redemonstrated aneurysm clip.   VENTRICLES and EXTRA-AXIAL SPACES: Parenchymal atrophy with prominence of the ventricles and cortical sulci. Ex vacuo dilatation of the left lateral ventricle.   OTHER FINDINGS: There are calcifications within the cavernous carotids   CERVICAL SPINE:   ALIGNMENT: No traumatic malalignment. VERTEBRAE: No acute loss of vertebral body height. DISC SPACE: Disc space narrowing is most pronounced at C6/C7. SPINAL CANAL: Multilevel facet and uncovertebral arthropathy with varying degrees of neural foraminal stenosis. Prominent posterior disc osteophyte complex at C6/C7 contributes to moderate central narrowing. PREVERTEBRAL SOFT TISSUES: No prevertebral soft tissue swelling.  LUNG APICES: Emphysematous changes in the visualized lungs. Biapical pleural thickening/scarring.   OTHER FINDINGS: None.       Frontal scalp swelling compatible with soft tissue contusion in the setting of trauma. No underlying depressed calvarial fracture.   No acute intracranial hemorrhage, mass effect or midline shift.   Redemonstrated postsurgical changes of craniotomy and large area of encephalomalacia throughout the left hemisphere. Additional nonspecific scattered white matter hypodensities may represent sequela of small vessel ischemia.   Cervical spondylosis without acute loss of vertebral body height or traumatic malalignment.     MACRO: None.   Signed by: Evan Finkelstein 12/11/2023 5:17 AM Dictation workstation:   [id number]    CT cervical spine wo IV contrast    Result Date: 12/11/2023  Interpreted By:  Finkelstein, Evan, STUDY: CT HEAD WO IV CONTRAST; CT CERVICAL SPINE WO IV CONTRAST;  12/11/2023 4:55 am   INDICATION: fall head trauma; Signs/Symptoms:fall head trauma.   COMPARISON: CT brain and cervical spine 11/01/2023   ACCESSION NUMBER(S): [id number]; [id number]   ORDERING CLINICIAN: HEMANTH LAINEZ   TECHNIQUE: Noncontrast CT images of the head with coronal and sagittal reconstructions. Axial noncontrast CT images of the cervical spine with coronal and sagittal reconstructed images.   FINDINGS: EXTRACRANIAL SOFT TISSUES: Frontal scalp swelling.   CALVARIUM: No depressed skull fracture. No destructive osseous lesion. Left side craniotomy changes again seen.   PARANASAL SINUSES/MASTOIDS: The visualized paranasal sinuses and mastoid air cells are aerated.   HEMORRHAGE: No acute intracranial hemorrhage.   BRAIN PARENCHYMA: Gray-white matter interfaces are preserved. No mass effect or midline shift. Large area of encephalomalacia again seen in the left hemisphere. Additional nonspecific scattered white matter hypodensities are present. Redemonstrated aneurysm clip.   VENTRICLES and EXTRA-AXIAL  SPACES: Parenchymal atrophy with prominence of the ventricles and cortical sulci. Ex vacuo dilatation of the left lateral ventricle.   OTHER FINDINGS: There are calcifications within the cavernous carotids   CERVICAL SPINE:   ALIGNMENT: No traumatic malalignment. VERTEBRAE: No acute loss of vertebral body height. DISC SPACE: Disc space narrowing is most pronounced at C6/C7. SPINAL CANAL: Multilevel facet and uncovertebral arthropathy with varying degrees of neural foraminal stenosis. Prominent posterior disc osteophyte complex at C6/C7 contributes to moderate central narrowing. PREVERTEBRAL SOFT TISSUES: No prevertebral soft tissue swelling. LUNG APICES: Emphysematous changes in the visualized lungs. Biapical pleural thickening/scarring.   OTHER FINDINGS: None.       Frontal scalp swelling compatible with soft tissue contusion in the setting of trauma. No underlying depressed calvarial fracture.   No acute intracranial hemorrhage, mass effect or midline shift.   Redemonstrated postsurgical changes of craniotomy and large area of encephalomalacia throughout the left hemisphere. Additional nonspecific scattered white matter hypodensities may represent sequela of small vessel ischemia.   Cervical spondylosis without acute loss of vertebral body height or traumatic malalignment.     MACRO: None.   Signed by: Evan Finkelstein 12/11/2023 5:17 AM Dictation workstation:   [id number]        Assessment/Plan   Principal Problem:    Pneumonia of left lower lobe due to infectious organism  - Ceftriaxone and azithromycin  - Robitussin and tessalon perles for cough  - Duonebs prn    ELLY  - IVF  - re-check Cr in am  - if no improvement, consult nephrology    Afib  - not on AC  - not on antiarrhythmic or rate controlling medication  - telemetry    HTN  - hold lisinopril due to ELLY    BPH  - flomax    HLD  - not on a statin    CVA  - not on ASA or statin- unsure why not ordered.            I spent 50 minutes in the professional and  overall care of this patient.      Iesha Garcia MD

## 2023-12-22 NOTE — PROGRESS NOTES
Pharmacy Medication History Review    Rodri Whitfield is a 78 y.o. male admitted for Pneumonia of left lower lobe due to infectious organism. Pharmacy reviewed the patient's [id number] medications and allergies for accuracy.    The list below reflectives the updated PTA list. Please review each medication in order reconciliation for additional clarification and justification.  Medications Prior to Admission   Medication Sig Dispense Refill Last Dose    acetaminophen (Tylenol) 325 mg tablet Take 2 tablets (650 mg) by mouth every 6 hours if needed for mild pain (1 - 3).   Unknown    albuterol 90 mcg/actuation inhaler Inhale 2 puffs every 4 hours if needed for shortness of breath.   Unknown    cholecalciferol (Vitamin D-3) 25 MCG (1000 UT) tablet Take 1 tablet (25 mcg) by mouth once daily.   Unknown    gabapentin (Neurontin) 100 mg capsule Take 1 capsule (100 mg) by mouth 2 times a day. For neuropathy.       lactase (Lactaid) 3,000 unit tablet Take 1 tablet by mouth 3 times a day before meals. For dairy intolerance.   Unknown    lisinopril 10 mg tablet Take 1 tablet (10 mg) by mouth once daily.   Unknown    loperamide (Imodium) 2 mg capsule Take 1 capsule (2 mg) by mouth every 8 hours if needed for diarrhea.   Unknown    LORazepam (Ativan) 1 mg tablet Take 1 tablet (1 mg) by mouth every 8 hours if needed for anxiety.   Unknown    melatonin 10 mg tablet Take 1 tablet (10 mg) by mouth as needed at bedtime (FOR INSOMNIA).   Unknown    nicotine polacrilex (Commit) 2 mg lozenge Take 1 lozenge (2 mg) by mouth every 4 hours if needed for smoking cessation.   Unknown    ondansetron (Zofran) 4 mg tablet Take 1 tablet (4 mg) by mouth every 4 hours if needed for nausea or vomiting.   Unknown    psyllium (Metamucil) 3.4 gram packet Take 1 packet by mouth once daily.   Unknown    sertraline (Zoloft) 100 mg tablet Take 1 tablet (100 mg) by mouth once daily. FOR DEPRESSION   Unknown    tamsulosin (Flomax) 0.4 mg 24 hr capsule  Take 1 capsule (0.4 mg) by mouth once daily at bedtime.   Unknown        The list below reflectives the updated allergy list. Please review each documented allergy for additional clarification and justification.  Allergies  Reviewed by Keith Spear, EMT on 12/21/2023   No Known Allergies         Below are additional concerns with the patient's PTA list.  Prior to Admission Medications   Prescriptions Last Dose Informant Patient Reported? Taking?   LORazepam (Ativan) 1 mg tablet Unknown  Yes No   Sig: Take 1 tablet (1 mg) by mouth every 8 hours if needed for anxiety.   acetaminophen (Tylenol) 325 mg tablet Unknown  Yes No   Sig: Take 2 tablets (650 mg) by mouth every 6 hours if needed for mild pain (1 - 3).   albuterol 90 mcg/actuation inhaler Unknown  Yes No   Sig: Inhale 2 puffs every 4 hours if needed for shortness of breath.   cholecalciferol (Vitamin D-3) 25 MCG (1000 UT) tablet Unknown  Yes No   Sig: Take 1 tablet (25 mcg) by mouth once daily.   gabapentin (Neurontin) 100 mg capsule   Yes No   Sig: Take 1 capsule (100 mg) by mouth 2 times a day. For neuropathy.   lactase (Lactaid) 3,000 unit tablet Unknown  Yes No   Sig: Take 1 tablet by mouth 3 times a day before meals. For dairy intolerance.   lisinopril 10 mg tablet Unknown  Yes No   Sig: Take 1 tablet (10 mg) by mouth once daily.   loperamide (Imodium) 2 mg capsule Unknown  Yes No   Sig: Take 1 capsule (2 mg) by mouth every 8 hours if needed for diarrhea.   melatonin 10 mg tablet Unknown  Yes No   Sig: Take 1 tablet (10 mg) by mouth as needed at bedtime (FOR INSOMNIA).   nicotine polacrilex (Commit) 2 mg lozenge Unknown  Yes No   Sig: Take 1 lozenge (2 mg) by mouth every 4 hours if needed for smoking cessation.   ondansetron (Zofran) 4 mg tablet Unknown  Yes No   Sig: Take 1 tablet (4 mg) by mouth every 4 hours if needed for nausea or vomiting.   psyllium (Metamucil) 3.4 gram packet Unknown  Yes No   Sig: Take 1 packet by mouth once daily.    sertraline (Zoloft) 100 mg tablet Unknown  Yes No   Sig: Take 1 tablet (100 mg) by mouth once daily. FOR DEPRESSION   tamsulosin (Flomax) 0.4 mg 24 hr capsule Unknown  Yes No   Sig: Take 1 capsule (0.4 mg) by mouth once daily at bedtime.      Facility-Administered Medications: None    Per [place]  Prior to Admission Medications   Prescriptions Last Dose Informant Patient Reported? Taking?   LORazepam (Ativan) 1 mg tablet Unknown  Yes No   Sig: Take 1 tablet (1 mg) by mouth every 8 hours if needed for anxiety.   acetaminophen (Tylenol) 325 mg tablet Unknown  Yes No   Sig: Take 2 tablets (650 mg) by mouth every 6 hours if needed for mild pain (1 - 3).   albuterol 90 mcg/actuation inhaler Unknown  Yes No   Sig: Inhale 2 puffs every 4 hours if needed for shortness of breath.   cholecalciferol (Vitamin D-3) 25 MCG (1000 UT) tablet Unknown  Yes No   Sig: Take 1 tablet (25 mcg) by mouth once daily.   gabapentin (Neurontin) 100 mg capsule   Yes No   Sig: Take 1 capsule (100 mg) by mouth 2 times a day. For neuropathy.   lactase (Lactaid) 3,000 unit tablet Unknown  Yes No   Sig: Take 1 tablet by mouth 3 times a day before meals. For dairy intolerance.   lisinopril 10 mg tablet Unknown  Yes No   Sig: Take 1 tablet (10 mg) by mouth once daily.   loperamide (Imodium) 2 mg capsule Unknown  Yes No   Sig: Take 1 capsule (2 mg) by mouth every 8 hours if needed for diarrhea.   melatonin 10 mg tablet Unknown  Yes No   Sig: Take 1 tablet (10 mg) by mouth as needed at bedtime (FOR INSOMNIA).   nicotine polacrilex (Commit) 2 mg lozenge Unknown  Yes No   Sig: Take 1 lozenge (2 mg) by mouth every 4 hours if needed for smoking cessation.   ondansetron (Zofran) 4 mg tablet Unknown  Yes No   Sig: Take 1 tablet (4 mg) by mouth every 4 hours if needed for nausea or vomiting.   psyllium (Metamucil) 3.4 gram packet Unknown  Yes No   Sig: Take 1 packet by mouth once daily.   sertraline (Zoloft) 100 mg tablet Unknown  Yes No   Sig: Take 1  tablet (100 mg) by mouth once daily. FOR DEPRESSION   tamsulosin (Flomax) 0.4 mg 24 hr capsule Unknown  Yes No   Sig: Take 1 capsule (0.4 mg) by mouth once daily at bedtime.      Facility-Administered Medications: None    Per [facility]    Aliza Crook CPhT

## 2023-12-22 NOTE — ED PROVIDER NOTES
"EMERGENCY MEDICINE EVALUATION NOTE    History of Present Illness     Chief Complaint:   Chief Complaint   Patient presents with    Cough     PT is at baseline mental status , is currently being treated for bronchitis, pt is not short of breath. Facility sent him out for evaluation.        HPI: Rodri Whitfield is a 78 y.o. male with past medical history of dementia, CVA, hypertension, and atrial fibrillation on anticoagulation who presents with complaint of a cough.  Patient is reportedly being treated for bronchitis and facility sent in for evaluation.  Denies any shortness of breath.  When asked any questions patient states \"woody \".  He has no other complaints at this time.  He is only alert and oriented x 1 which was reportedly his baseline.  Limited history secondary to this.    Previous History     Past Medical History:   Diagnosis Date    Personal history of other diseases of the circulatory system     History of hypertension    Personal history of other diseases of the circulatory system 06/04/2018    History of atrial fibrillation    Personal history of other endocrine, nutritional and metabolic disease     History of hyperlipidemia    Personal history of other medical treatment     History of cardioversion    Unspecified atrial fibrillation (CMS/HCC)     Atrial fibrillation status post cardioversion     Past Surgical History:   Procedure Laterality Date    MR HEAD ANGIO WO IV CONTRAST  2/25/2023    MR HEAD ANGIO WO IV CONTRAST PAR MRI    MR NECK ANGIO WO IV CONTRAST  2/25/2023    MR NECK ANGIO WO IV CONTRAST PAR MRI     Social History     Tobacco Use    Smoking status: Unknown   Vaping Use    Vaping Use: Unknown     No family history on file.  No Known Allergies  Current Outpatient Medications   Medication Instructions    acetaminophen (Tylenol) 325 mg tablet 2 tablets, oral, Every 6 hours PRN    albuterol 90 mcg/actuation inhaler 2 puffs, inhalation, Every 4 hours PRN    cholecalciferol (Vitamin D-3) 25 MCG " (1000 UT) tablet 1 tablet, oral, Daily RT    lactase (Lactaid) 3,000 unit tablet 1 tablet, oral, 3 times daily before meals    lisinopril 10 mg, oral, Daily RT    loperamide (Imodium) 2 mg capsule 1 capsule, oral, Every 8 hours PRN    LORazepam (Ativan) 1 mg tablet 1 tablet, oral, Every 8 hours PRN    melatonin 10 mg tablet 1 tablet, oral, Nightly PRN    nicotine polacrilex (Commit) 2 mg lozenge 1 lozenge, oral, Every 4 hours PRN    ondansetron (Zofran) 4 mg tablet 1 tablet, oral, Every 4 hours PRN    psyllium (Metamucil) 3.4 gram packet 1 packet, oral, Daily    sertraline (Zoloft) 100 mg tablet 1 tablet, oral, Daily RT, FOR DEPRESSION    tamsulosin (Flomax) 0.4 mg 24 hr capsule 1 capsule, oral, Nightly       Physical Exam     Appearance: Alert, orientedx1 , minimally cooperative,  in acute distress.  Cachectic appearing.     Skin: Intact,  dry skin, no lesions, rash, petechiae or purpura.      Eyes: PERRLA, EOMs intact,  Conjunctiva pink with no redness or exudates. Cornea & anterior chamber are clear, Eyelids without lesions. No scleral icterus.      ENT: Hearing grossly intact. External auditory canals patent, tympanic membranes intact with visible landmarks. Nares patent, mucus membranes dry. Dentition without lesions. Pharynx clear, uvula midline.      Neck: Supple, without meningismus. Thyroid not palpable. Trachea at midline. No lymphadenopathy.     Pulmonary: Clear bilaterally with good chest wall excursion. No rales, rhonchi or wheezing. No accessory muscle use or stridor.     Cardiac: Normal S1, S2 without murmur, rub, gallop or extrasystole. No JVD, Carotids without bruits.     Abdomen: Soft, nontender, active bowel sounds.  No palpable organomegaly.  No rebound or guarding.  No CVA tenderness.     Genitourinary: Exam deferred.     Musculoskeletal: Full range of motion. no pain, edema, or deformity. Pulses full and equal. No cyanosis or clubbing.      Neurological:  Cranial nerves II through XII are  grossly intact, finger-nose touch is normal, normal sensation, no weakness, no focal findings identified.     Psychiatric: Appropriate mood and affect.      Results     Labs Reviewed   COMPREHENSIVE METABOLIC PANEL - Abnormal       Result Value    Glucose 100 (*)     Sodium 139      Potassium 5.1      Chloride 105      Bicarbonate 24      Anion Gap 15      Urea Nitrogen 45 (*)     Creatinine 1.59 (*)     eGFR 44 (*)     Calcium 9.6      Albumin 4.0      Alkaline Phosphatase 83      Total Protein 7.2      AST 15      Bilirubin, Total 0.8      ALT 10     CBC WITH AUTO DIFFERENTIAL - Abnormal    WBC 12.9 (*)     nRBC 0.0      RBC 4.08 (*)     Hemoglobin 12.4 (*)     Hematocrit 38.4 (*)     MCV 94      MCH 30.4      MCHC 32.3      RDW 14.2      Platelets 225      Neutrophils % 88.9      Immature Granulocytes %, Automated 0.4      Lymphocytes % 5.6      Monocytes % 4.8      Eosinophils % 0.0      Basophils % 0.3      Neutrophils Absolute 11.45 (*)     Immature Granulocytes Absolute, Automated 0.05      Lymphocytes Absolute 0.72 (*)     Monocytes Absolute 0.62      Eosinophils Absolute 0.00      Basophils Absolute 0.04     SARS-COV-2 AND INFLUENZA A/B PCR - Normal    Flu A Result Not Detected      Flu B Result Not Detected      Coronavirus 2019, PCR Not Detected      Narrative:     This assay has received FDA Emergency Use Authorization (EUA) and  is only authorized for the duration of time that circumstances exist to justify the authorization of the emergency use of in vitro diagnostic tests for the detection of SARS-CoV-2 virus and/or diagnosis of COVID-19 infection under section 564(b)(1) of the Act, 21 U.S.C. 360bbb-3(b)(1). Testing for SARS-CoV-2 is only recommended for patients who meet current clinical and/or epidemiological criteria as defined by federal, state, or local public health directives. This assay is an in vitro diagnostic nucleic acid amplification test for the qualitative detection of SARS-CoV-2,  Influenza A, and Influenza B from nasopharyngeal specimens and has been validated for use at [facility]. Negative results do not preclude COVID-19 infections or Influenza A/B infections, and should not be used as the sole basis for diagnosis, treatment, or other management decisions. If Influenza A/B and RSV PCR results are negative, testing for Parainfluenza virus, Adenovirus and Metapneumovirus is routinely performed for [facility] [place] inpatients, and is available on other patients by placing an add-on request.    SERIAL TROPONIN-INITIAL - Normal    Troponin I, High Sensitivity 17      Narrative:     Less than 99th percentile of normal range cutoff-  Female and children under 18 years old <14 ng/L; Male <21 ng/L: Negative  Repeat testing should be performed if clinically indicated.     Female and children under 18 years old 14-50 ng/L; Male 21-50 ng/L:  Consistent with possible cardiac damage and possible increased clinical   risk. Serial measurements may help to assess extent of myocardial damage.     >50 ng/L: Consistent with cardiac damage, increased clinical risk and  myocardial infarction. Serial measurements may help assess extent of   myocardial damage.      NOTE: Children less than 1 year old may have higher baseline troponin   levels and results should be interpreted in conjunction with the overall   clinical context.     NOTE: Troponin I testing is performed using a different   testing methodology at [facility] than at [facility]. Direct result comparisons should only   be made within the same method.   TROPONIN SERIES- (INITIAL, 1 HR)    Narrative:     The following orders were created for panel order Troponin I Series, High Sensitivity (0, 1 HR).  Procedure                               Abnormality         Status                     ---------                               -----------         ------                    "  Troponin I, High Sensiti...[[id number]]  Normal              Final result               Troponin, High Sensitivi...[[id number]]                                                   Please view results for these tests on the individual orders.   SERIAL TROPONIN, 1 HOUR     XR chest 1 view   Final Result   Worsening left basilar opacity suggestive of pneumonia in the   appropriate clinical setting. Recommend follow-up transfer resolution.        Signed by: Cora Murcia 12/21/2023 7:03 PM   Dictation workstation:   [id number]            ED Course & Medical Decision Making     Medications   cefTRIAXone (Rocephin) in dextrose 5 % water (D5W) 50 mL IV 2 g (has no administration in time range)   azithromycin (Zithromax) in dextrose 5 % in water (D5W) 250 mL  mg (has no administration in time range)   sodium chloride 0.9 % bolus 1,000 mL (1,000 mL intravenous New Bag 12/21/23 1915)     Diagnoses as of 12/21/23 2003   Pneumonia of left lower lobe due to infectious organism   ELLY (acute kidney injury) (CMS/[facility])     Heart Rate:  []   Temp:  [36.1 °C (97 °F)]   Resp:  [16-18]   BP: (123-131)/(66-93)   Height:  [172.7 cm (5' 8\")]   Weight:  [59 kg (130 lb)]   SpO2:  [94 %-96 %]      Rodri Whitfield is a 78 y.o. male with past medical history of dementia, CVA, hypertension, and atrial fibrillation on anticoagulation who presents with complaint of a cough.  EKG did show controlled atrial fibrillation with a rate around 99 bpm no additional signs of acute ischemic change.  Patient initially nontachycardic although did have some elevation in center into the 100s with mildly RVR.  Otherwise at his baseline mental status, normotensive, afebrile, saturating 96% on room air.  Lab work consistent with new acute kidney injury with elevated creatinine 1.59 and BUN 45 concern for possible dehydration.  Electrolytes otherwise within normal limits.  LFTs normal.  Leukocytosis of 12.9 noted and mild anemia.  Influenza COVID-19 " testing were negative.  Patient's chest x-ray concerning for left basilar pneumonia.  Given his ELLY and underlying pneumonia he was treated with IV Rocephin and azithromycin.  He was given 1 L normal saline.  Ultimately admitted to the hospitalist team for further management.    Procedures   Procedures    Diagnosis     1. Pneumonia of left lower lobe due to infectious organism    2. ELLY (acute kidney injury) (CMS/[facility])        Disposition     Admitted to hospitalist team, discussed differential and findings with patient as well as any family members at bedside.      ED Prescriptions    None            Satish Smiley DO  12/22/23 154

## 2023-12-22 NOTE — PROGRESS NOTES
12/22/2023 1122 Transitional Care Coordination Progress Note:  Primary Problem: ELLY, PNA LLL ( CXR worsening left basilar opaciY)  Plan per Medical/ Surgical Team: Hospitalist  Payor Source: Medicare  Discharge Plan: RE: [facility]  Barriers to Discharge: WBC 12/9, CXR PNA worse  ADOD: TBD    TCC spoke with Malena / Nursing staff at [facility]( [phone]) [facility]. She endorses patient is A & O 2-3. He is non verbal CVA residual aphasia/ right sided weakness).  She endorses patient is ambulatory in halls . He has a walker & will not use it. He is Chronic Falls precautions.  Diet is thickened liquids. [facility] reports patient is very unsteady on his feet. Often walks in [place] stumbling . He goes into the [facility] at [facility] and sneaks treats to eat. He is Aspiration precautions at Facility.  Patient is incontinent of bowel/ bladder wears incontinent briefs.  No barriers to return.   Addie BLAIR RN TCC CCM

## 2023-12-22 NOTE — PROGRESS NOTES
Rodri Whitfield is a 78 y.o. male on day 1 of admission presenting with Pneumonia of left lower lobe due to infectious organism.      Subjective   Pt seen and examined.        Objective     Last Recorded Vitals  /81 (Patient Position: Lying)   Pulse 101   Temp 36.7 °C (98 °F) (Oral)   Resp 16   Wt 59 kg (130 lb)   SpO2 95%   Intake/Output last 3 Shifts:    Intake/Output Summary (Last 24 hours) at 12/22/2023 1147  Last data filed at 12/22/2023 0800  Gross per 24 hour   Intake --   Output 0 ml   Net 0 ml       Admission Weight  Weight: 59 kg (130 lb) (12/21/23 1737)    Daily Weight  12/21/23 : 59 kg (130 lb)      Physical Exam    Relevant Results  Results for orders placed or performed during the hospital encounter of 12/21/23 (from the past 24 hour(s))   Comprehensive Metabolic Panel   Result Value Ref Range    Glucose 100 (H) 74 - 99 mg/dL    Sodium 139 136 - 145 mmol/L    Potassium 5.1 3.5 - 5.3 mmol/L    Chloride 105 98 - 107 mmol/L    Bicarbonate 24 21 - 32 mmol/L    Anion Gap 15 10 - 20 mmol/L    Urea Nitrogen 45 (H) 6 - 23 mg/dL    Creatinine 1.59 (H) 0.50 - 1.30 mg/dL    eGFR 44 (L) >60 mL/min/1.73m*2    Calcium 9.6 8.6 - 10.3 mg/dL    Albumin 4.0 3.4 - 5.0 g/dL    Alkaline Phosphatase 83 33 - 136 U/L    Total Protein 7.2 6.4 - 8.2 g/dL    AST 15 9 - 39 U/L    Bilirubin, Total 0.8 0.0 - 1.2 mg/dL    ALT 10 10 - 52 U/L   CBC and Auto Differential   Result Value Ref Range    WBC 12.9 (H) 4.4 - 11.3 x10*3/uL    nRBC 0.0 0.0 - 0.0 /100 WBCs    RBC 4.08 (L) 4.50 - 5.90 x10*6/uL    Hemoglobin 12.4 (L) 13.5 - 17.5 g/dL    Hematocrit 38.4 (L) 41.0 - 52.0 %    MCV 94 80 - 100 fL    MCH 30.4 26.0 - 34.0 pg    MCHC 32.3 32.0 - 36.0 g/dL    RDW 14.2 11.5 - 14.5 %    Platelets 225 150 - 450 x10*3/uL    Neutrophils % 88.9 40.0 - 80.0 %    Immature Granulocytes %, Automated 0.4 0.0 - 0.9 %    Lymphocytes % 5.6 13.0 - 44.0 %    Monocytes % 4.8 2.0 - 10.0 %    Eosinophils % 0.0 0.0 - 6.0 %    Basophils % 0.3 0.0 - 2.0  %    Neutrophils Absolute 11.45 (H) 1.60 - 5.50 x10*3/uL    Immature Granulocytes Absolute, Automated 0.05 0.00 - 0.50 x10*3/uL    Lymphocytes Absolute 0.72 (L) 0.80 - 3.00 x10*3/uL    Monocytes Absolute 0.62 0.05 - 0.80 x10*3/uL    Eosinophils Absolute 0.00 0.00 - 0.40 x10*3/uL    Basophils Absolute 0.04 0.00 - 0.10 x10*3/uL   Sars-CoV-2 and Influenza A/B PCR   Result Value Ref Range    Flu A Result Not Detected Not Detected    Flu B Result Not Detected Not Detected    Coronavirus 2019, PCR Not Detected Not Detected   Troponin I, High Sensitivity, Initial   Result Value Ref Range    Troponin I, High Sensitivity 17 0 - 20 ng/L   ECG 12 Lead   Result Value Ref Range    Ventricular Rate 100 BPM    QRS Duration 66 ms    QT Interval 326 ms    QTC Calculation(Bazett) 420 ms    R Axis 63 degrees    T Axis 10 degrees    QRS Count 17 beats    Q Onset 228 ms    T Offset 391 ms    QTC Fredericia 386 ms   Troponin, High Sensitivity, 1 Hour   Result Value Ref Range    Troponin I, High Sensitivity 15 0 - 20 ng/L   CBC and Auto Differential   Result Value Ref Range    WBC 8.7 4.4 - 11.3 x10*3/uL    nRBC 0.0 0.0 - 0.0 /100 WBCs    RBC 3.99 (L) 4.50 - 5.90 x10*6/uL    Hemoglobin 12.0 (L) 13.5 - 17.5 g/dL    Hematocrit 38.5 (L) 41.0 - 52.0 %    MCV 97 80 - 100 fL    MCH 30.1 26.0 - 34.0 pg    MCHC 31.2 (L) 32.0 - 36.0 g/dL    RDW 14.0 11.5 - 14.5 %    Platelets 205 150 - 450 x10*3/uL    Neutrophils % 76.0 40.0 - 80.0 %    Immature Granulocytes %, Automated 0.3 0.0 - 0.9 %    Lymphocytes % 14.9 13.0 - 44.0 %    Monocytes % 7.6 2.0 - 10.0 %    Eosinophils % 0.9 0.0 - 6.0 %    Basophils % 0.3 0.0 - 2.0 %    Neutrophils Absolute 6.61 (H) 1.60 - 5.50 x10*3/uL    Immature Granulocytes Absolute, Automated 0.03 0.00 - 0.50 x10*3/uL    Lymphocytes Absolute 1.30 0.80 - 3.00 x10*3/uL    Monocytes Absolute 0.66 0.05 - 0.80 x10*3/uL    Eosinophils Absolute 0.08 0.00 - 0.40 x10*3/uL    Basophils Absolute 0.03 0.00 - 0.10 x10*3/uL   Magnesium    Result Value Ref Range    Magnesium 2.00 1.60 - 2.40 mg/dL   Basic metabolic panel   Result Value Ref Range    Glucose 88 74 - 99 mg/dL    Sodium 141 136 - 145 mmol/L    Potassium 3.9 3.5 - 5.3 mmol/L    Chloride 110 (H) 98 - 107 mmol/L    Bicarbonate 22 21 - 32 mmol/L    Anion Gap 13 10 - 20 mmol/L    Urea Nitrogen 38 (H) 6 - 23 mg/dL    Creatinine 1.16 0.50 - 1.30 mg/dL    eGFR 64 >60 mL/min/1.73m*2    Calcium 8.8 8.6 - 10.3 mg/dL   Electrocardiogram, 12-lead PRN ACS symptoms   Result Value Ref Range    Ventricular Rate 99 BPM    QRS Duration 68 ms    QT Interval 326 ms    QTC Calculation(Bazett) 418 ms    R Axis 61 degrees    T Axis 14 degrees    QRS Count 17 beats    Q Onset 227 ms    T Offset 390 ms    QTC Fredericia 385 ms        XR chest 1 view   Final Result   Worsening left basilar opacity suggestive of pneumonia in the   appropriate clinical setting. Recommend follow-up transfer resolution.        Signed by: Cora Murcia 12/21/2023 7:03 PM   Dictation workstation:   [id number]          Scheduled medications  atorvastatin, 40 mg, oral, Nightly  azithromycin, 500 mg, intravenous, q24h  cefTRIAXone, 1 g, intravenous, q24h  cholecalciferol, 1,000 Units, oral, Daily  enoxaparin, 40 mg, subcutaneous, q24h  lactase, 3,000 Units, oral, TID AC  pantoprazole, 40 mg, oral, Daily before breakfast   Or  pantoprazole, 40 mg, intravenous, Daily before breakfast  polyethylene glycol, 17 g, oral, Daily  psyllium, 1 packet, oral, Daily  sertraline, 100 mg, oral, Daily  tamsulosin, 0.4 mg, oral, Nightly      Continuous medications  sodium chloride 0.9%, 75 mL/hr, Last Rate: 75 mL/hr (12/22/23 0937)      PRN medications  PRN medications: acetaminophen **OR** acetaminophen **OR** acetaminophen, acetaminophen, melatonin, nicotine polacrilex, ondansetron ODT **OR** ondansetron         Assessment/Plan                  Principal Problem:    Pneumonia of left lower lobe due to infectious organism     Pneumonia of left lower  lobe due to infectious organism  - Ceftriaxone and azithromycin  - Robitussin and tessalon perles for cough  - Duonebs prn     ELLY  - better today     Afib  - not on AC  - not on antiarrhythmic or rate controlling medication  - telemetry     HTN  - hold lisinopril due to ELLY     BPH  - flomax     HLD  - not on a statin     CVA  - not on ASA or statin    DVT ppx              Oh Suresh MD

## 2023-12-22 NOTE — CARE PLAN
The patient's goals for the shift include Patient remains comfortable    The clinical goals for the shift include Patient will remain free from falls and injury      Problem: Fall/Injury  Goal: Not fall by end of shift  Outcome: Progressing  Goal: Be free from injury by end of the shift  Outcome: Progressing  Goal: Verbalize understanding of personal risk factors for fall in the hospital  Outcome: Progressing  Goal: Verbalize understanding of risk factor reduction measures to prevent injury from fall in the home  Outcome: Progressing  Goal: Use assistive devices by end of the shift  Outcome: Progressing  Goal: Pace activities to prevent fatigue by end of the shift  Outcome: Progressing     Problem: Respiratory  Goal: Clear secretions with interventions this shift  Outcome: Progressing  Goal: Minimize anxiety/maximize coping throughout shift  Outcome: Progressing  Goal: Minimal/no exertional discomfort or dyspnea this shift  Outcome: Progressing  Goal: No signs of respiratory distress (eg. Use of accessory muscles. Peds grunting)  Outcome: Progressing  Goal: Patent airway maintained this shift  Outcome: Progressing  Goal: Tolerate mechanical ventilation evidenced by VS/agitation level this shift  Outcome: Progressing  Goal: Tolerate pulmonary toileting this shift  Outcome: Progressing  Goal: Verbalize decreased shortness of breath this shift  Outcome: Progressing  Goal: Wean oxygen to maintain O2 saturation per order/standard this shift  Outcome: Progressing  Goal: Increase self care and/or family involvement in next 24 hours  Outcome: Progressing

## 2023-12-23 ENCOUNTER — PHARMACY VISIT (OUTPATIENT)
Dept: PHARMACY | Facility: CLINIC | Age: 78
End: 2023-12-23
Payer: MEDICARE

## 2023-12-23 LAB
ANION GAP SERPL CALC-SCNC: 13 MMOL/L (ref 10–20)
BUN SERPL-MCNC: 30 MG/DL (ref 6–23)
CALCIUM SERPL-MCNC: 8.7 MG/DL (ref 8.6–10.3)
CHLORIDE SERPL-SCNC: 110 MMOL/L (ref 98–107)
CO2 SERPL-SCNC: 24 MMOL/L (ref 21–32)
CREAT SERPL-MCNC: 1.09 MG/DL (ref 0.5–1.3)
ERYTHROCYTE [DISTWIDTH] IN BLOOD BY AUTOMATED COUNT: 13.7 % (ref 11.5–14.5)
GFR SERPL CREATININE-BSD FRML MDRD: 69 ML/MIN/1.73M*2
GLUCOSE SERPL-MCNC: 86 MG/DL (ref 74–99)
HCT VFR BLD AUTO: 39.5 % (ref 41–52)
HGB BLD-MCNC: 12.6 G/DL (ref 13.5–17.5)
HOLD SPECIMEN: NORMAL
HOLD SPECIMEN: NORMAL
MCH RBC QN AUTO: 30.4 PG (ref 26–34)
MCHC RBC AUTO-ENTMCNC: 31.9 G/DL (ref 32–36)
MCV RBC AUTO: 95 FL (ref 80–100)
NRBC BLD-RTO: 0 /100 WBCS (ref 0–0)
PLATELET # BLD AUTO: 237 X10*3/UL (ref 150–450)
POTASSIUM SERPL-SCNC: 3.9 MMOL/L (ref 3.5–5.3)
RBC # BLD AUTO: 4.15 X10*6/UL (ref 4.5–5.9)
SODIUM SERPL-SCNC: 143 MMOL/L (ref 136–145)
WBC # BLD AUTO: 8.7 X10*3/UL (ref 4.4–11.3)

## 2023-12-23 PROCEDURE — 2500000004 HC RX 250 GENERAL PHARMACY W/ HCPCS (ALT 636 FOR OP/ED): Performed by: HOSPITALIST

## 2023-12-23 PROCEDURE — 2500000001 HC RX 250 WO HCPCS SELF ADMINISTERED DRUGS (ALT 637 FOR MEDICARE OP): Performed by: HOSPITALIST

## 2023-12-23 PROCEDURE — 96372 THER/PROPH/DIAG INJ SC/IM: CPT | Performed by: INTERNAL MEDICINE

## 2023-12-23 PROCEDURE — 2500000004 HC RX 250 GENERAL PHARMACY W/ HCPCS (ALT 636 FOR OP/ED): Performed by: INTERNAL MEDICINE

## 2023-12-23 PROCEDURE — 96372 THER/PROPH/DIAG INJ SC/IM: CPT | Performed by: HOSPITALIST

## 2023-12-23 PROCEDURE — RXMED WILLOW AMBULATORY MEDICATION CHARGE

## 2023-12-23 PROCEDURE — 80048 BASIC METABOLIC PNL TOTAL CA: CPT | Performed by: INTERNAL MEDICINE

## 2023-12-23 PROCEDURE — 1100000001 HC PRIVATE ROOM DAILY

## 2023-12-23 PROCEDURE — 99239 HOSP IP/OBS DSCHRG MGMT >30: CPT | Performed by: INTERNAL MEDICINE

## 2023-12-23 PROCEDURE — 36415 COLL VENOUS BLD VENIPUNCTURE: CPT | Performed by: INTERNAL MEDICINE

## 2023-12-23 PROCEDURE — 85027 COMPLETE CBC AUTOMATED: CPT | Performed by: INTERNAL MEDICINE

## 2023-12-23 RX ORDER — LORAZEPAM 2 MG/ML
2 INJECTION INTRAMUSCULAR ONCE
Status: COMPLETED | OUTPATIENT
Start: 2023-12-23 | End: 2023-12-23

## 2023-12-23 RX ORDER — CEFDINIR 300 MG/1
300 CAPSULE ORAL 2 TIMES DAILY
Qty: 6 CAPSULE | Refills: 0 | Status: SHIPPED | OUTPATIENT
Start: 2023-12-23 | End: 2023-12-26

## 2023-12-23 RX ORDER — AZITHROMYCIN 500 MG/1
500 TABLET, FILM COATED ORAL DAILY
Qty: 1 TABLET | Refills: 0 | Status: SHIPPED | OUTPATIENT
Start: 2023-12-23 | End: 2023-12-24

## 2023-12-23 RX ORDER — HALOPERIDOL 5 MG/ML
2 INJECTION INTRAMUSCULAR ONCE
Status: COMPLETED | OUTPATIENT
Start: 2023-12-23 | End: 2023-12-23

## 2023-12-23 RX ADMIN — LORAZEPAM 2 MG: 2 INJECTION INTRAMUSCULAR; INTRAVENOUS at 18:14

## 2023-12-23 RX ADMIN — AZITHROMYCIN MONOHYDRATE 500 MG: 500 INJECTION, POWDER, LYOPHILIZED, FOR SOLUTION INTRAVENOUS at 23:45

## 2023-12-23 RX ADMIN — Medication 9 MG: at 22:36

## 2023-12-23 RX ADMIN — PSYLLIUM HUSK 1 PACKET: 3.4 POWDER ORAL at 08:54

## 2023-12-23 RX ADMIN — Medication 1000 UNITS: at 08:54

## 2023-12-23 RX ADMIN — HALOPERIDOL LACTATE 2 MG: 5 INJECTION, SOLUTION INTRAMUSCULAR at 10:23

## 2023-12-23 RX ADMIN — ACETAMINOPHEN 650 MG: 650 SOLUTION ORAL at 10:09

## 2023-12-23 RX ADMIN — ENOXAPARIN SODIUM 40 MG: 40 INJECTION SUBCUTANEOUS at 22:36

## 2023-12-23 RX ADMIN — CEFTRIAXONE SODIUM 1 G: 1 INJECTION, SOLUTION INTRAVENOUS at 22:13

## 2023-12-23 RX ADMIN — SODIUM CHLORIDE 75 ML/HR: 9 INJECTION, SOLUTION INTRAVENOUS at 22:13

## 2023-12-23 RX ADMIN — Medication 3000 UNITS: at 11:49

## 2023-12-23 RX ADMIN — ACETAMINOPHEN 650 MG: 325 TABLET ORAL at 22:49

## 2023-12-23 RX ADMIN — PANTOPRAZOLE SODIUM 40 MG: 40 TABLET, DELAYED RELEASE ORAL at 06:32

## 2023-12-23 RX ADMIN — TAMSULOSIN HYDROCHLORIDE 0.4 MG: 0.4 CAPSULE ORAL at 22:36

## 2023-12-23 RX ADMIN — Medication 3000 UNITS: at 06:32

## 2023-12-23 RX ADMIN — POLYETHYLENE GLYCOL 3350 17 G: 17 POWDER, FOR SOLUTION ORAL at 08:55

## 2023-12-23 RX ADMIN — ATORVASTATIN CALCIUM 40 MG: 40 TABLET, FILM COATED ORAL at 22:36

## 2023-12-23 RX ADMIN — Medication 3000 UNITS: at 15:24

## 2023-12-23 RX ADMIN — SERTRALINE HYDROCHLORIDE 100 MG: 50 TABLET ORAL at 06:32

## 2023-12-23 ASSESSMENT — COGNITIVE AND FUNCTIONAL STATUS - GENERAL
DRESSING REGULAR UPPER BODY CLOTHING: A LITTLE
EATING MEALS: A LITTLE
WALKING IN HOSPITAL ROOM: A LITTLE
MOVING TO AND FROM BED TO CHAIR: A LITTLE
DRESSING REGULAR LOWER BODY CLOTHING: A LOT
PERSONAL GROOMING: A LOT
DRESSING REGULAR UPPER BODY CLOTHING: A LITTLE
DRESSING REGULAR LOWER BODY CLOTHING: A LITTLE
CLIMB 3 TO 5 STEPS WITH RAILING: A LOT
DAILY ACTIVITIY SCORE: 13
STANDING UP FROM CHAIR USING ARMS: A LITTLE
TURNING FROM BACK TO SIDE WHILE IN FLAT BAD: A LITTLE
TOILETING: A LOT
TOILETING: A LOT
STANDING UP FROM CHAIR USING ARMS: A LITTLE
HELP NEEDED FOR BATHING: A LITTLE
EATING MEALS: A LITTLE
DAILY ACTIVITIY SCORE: 18
MOVING FROM LYING ON BACK TO SITTING ON SIDE OF FLAT BED WITH BEDRAILS: A LITTLE
PERSONAL GROOMING: A LITTLE
MOBILITY SCORE: 17
TOILETING: A LITTLE
CLIMB 3 TO 5 STEPS WITH RAILING: A LOT
MOBILITY SCORE: 20
HELP NEEDED FOR BATHING: A LOT
EATING MEALS: A LITTLE
MOBILITY SCORE: 17
PERSONAL GROOMING: A LOT
WALKING IN HOSPITAL ROOM: A LITTLE
MOVING FROM LYING ON BACK TO SITTING ON SIDE OF FLAT BED WITH BEDRAILS: A LITTLE
MOVING TO AND FROM BED TO CHAIR: A LITTLE
CLIMB 3 TO 5 STEPS WITH RAILING: A LOT
DAILY ACTIVITIY SCORE: 16
MOVING TO AND FROM BED TO CHAIR: A LITTLE
TURNING FROM BACK TO SIDE WHILE IN FLAT BAD: A LITTLE
DRESSING REGULAR UPPER BODY CLOTHING: A LOT
WALKING IN HOSPITAL ROOM: A LITTLE
HELP NEEDED FOR BATHING: A LITTLE
DRESSING REGULAR LOWER BODY CLOTHING: A LITTLE

## 2023-12-23 ASSESSMENT — PAIN SCALES - GENERAL
PAINLEVEL_OUTOF10: 0 - NO PAIN
PAINLEVEL_OUTOF10: 0 - NO PAIN

## 2023-12-23 ASSESSMENT — PAIN SCALES - PAIN ASSESSMENT IN ADVANCED DEMENTIA (PAINAD)
FACIALEXPRESSION: SMILING OR INEXPRESSIVE
NEGVOCALIZATION: OCCASIONAL MOAN/GROAN, LOW SPEECH, NEGATIVE/DISAPPROVING QUALITY
BODYLANGUAGE: TENSE, DISTRESSED PACING, FIDGETING
CONSOLABILITY: DISTRACTED OR REASSURED BY VOICE/TOUCH
BREATHING: NORMAL
TOTALSCORE: 3

## 2023-12-23 NOTE — NURSING NOTE
150- report called Suzette at [facility] in the [place] unit. No questions at his time.

## 2023-12-23 NOTE — PROGRESS NOTES
Patient is medically cleared for discharge back to [facility].  Transport arranged for 4pm pickup.  Report to be called to [phone] and ask for [place] [facility].  Daughter, Tia is aware of discharge time.  Luanne Campbell RN

## 2023-12-23 NOTE — DISCHARGE SUMMARY
Discharge Diagnosis  Pneumonia of left lower lobe due to infectious organism    Issues Requiring Follow-Up  PCP follow up in 2 weeks    Discharge Meds     Your medication list        START taking these medications        Instructions Last Dose Given Next Dose Due   azithromycin 500 mg tablet  Commonly known as: Zithromax      Take 1 tablet (500 mg) by mouth once daily for 1 day.       cefdinir 300 mg capsule  Commonly known as: Omnicef      Take 1 capsule (300 mg) by mouth 2 times a day for 3 days.              CONTINUE taking these medications        Instructions Last Dose Given Next Dose Due   acetaminophen 325 mg tablet  Commonly known as: Tylenol           albuterol 90 mcg/actuation inhaler           cholecalciferol 25 MCG (1000 UT) tablet  Commonly known as: Vitamin D-3           gabapentin 100 mg capsule  Commonly known as: Neurontin           LACTASE ORAL           lisinopril 10 mg tablet           loperamide 2 mg capsule  Commonly known as: Imodium           LORazepam 1 mg tablet  Commonly known as: Ativan           melatonin 10 mg tablet           nicotine polacrilex 2 mg lozenge  Commonly known as: Commit           ondansetron 4 mg tablet  Commonly known as: Zofran           psyllium 3.4 gram packet  Commonly known as: Metamucil           sertraline 100 mg tablet  Commonly known as: Zoloft           tamsulosin 0.4 mg 24 hr capsule  Commonly known as: Flomax                     Where to Get Your Medications        These medications were sent to [facility]  [address], [address], [place] [address]      Hours: 8 AM to 6 PM Mon-Fri, 9 AM to 1 PM Saturday Phone: [phone]   azithromycin 500 mg tablet  cefdinir 300 mg capsule         Test Results Pending At Discharge  Pending Labs       No current pending labs.            Hospital Course   Pneumonia of left lower lobe due to infectious organism  - Ceftriaxone and azithromycin  - Robitussin and tessalon perles for cough  - Joeonebs  prn  -Discharged on cefdinir and azithromycin     ELLY  - better      Afib  - not on AC  - not on antiarrhythmic or rate controlling medication  - telemetry     HTN  -  continue lisinopril     BPH  - flomax     patient is feeling much better today.  He will be discharged back to SNF.  Advised him to follow-up with his PCP as outpatient in 1 to 2 weeks.      Discharge time spent more than 30 minutes.       Pertinent Physical Exam At Time of Discharge  Physical Exam    Constitutional: No acute distress, awake, alert  Head/Neck: Neck supple  Respiratory/Thorax: Lungs are Clear to auscultation  Cardiovascular: Regular, rate and rhythm,  2+ equal pulses of the extremities, normal S 1and S 2  Gastrointestinal: Nondistended, soft, non-tender, no rebound tenderness or guarding  Extremities: No edema. No calf tenderness.  Neurological: Awake and alert. No focal neurological deficits  Psychological: Appropriate mood and behavior    Outpatient Follow-Up  No future appointments.      Oh Suresh MD
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
## 2023-12-23 NOTE — CARE PLAN
The patient's goals for the shift include Patient remains comfortable    The clinical goals for the shift include Patient will remain free from falls and injury    Over the shift, the patient did not make progress toward the following goals. Barriers to progression include . Recommendations to address these barriers include   Problem: Fall/Injury  Goal: Not fall by end of shift  Outcome: Progressing  Goal: Be free from injury by end of the shift  Outcome: Progressing     Problem: Respiratory  Goal: Minimal/no exertional discomfort or dyspnea this shift  Outcome: Progressing   .

## 2023-12-24 VITALS
SYSTOLIC BLOOD PRESSURE: 145 MMHG | WEIGHT: 130 LBS | BODY MASS INDEX: 19.7 KG/M2 | RESPIRATION RATE: 18 BRPM | DIASTOLIC BLOOD PRESSURE: 102 MMHG | OXYGEN SATURATION: 93 % | HEIGHT: 68 IN | HEART RATE: 95 BPM | TEMPERATURE: 98.8 F

## 2023-12-24 LAB
Q ONSET: 227 MS
Q ONSET: 228 MS
QRS COUNT: 17 BEATS
QRS COUNT: 17 BEATS
QRS DURATION: 66 MS
QRS DURATION: 68 MS
QT INTERVAL: 326 MS
QT INTERVAL: 326 MS
QTC CALCULATION(BAZETT): 418 MS
QTC CALCULATION(BAZETT): 420 MS
QTC FREDERICIA: 385 MS
QTC FREDERICIA: 386 MS
R AXIS: 61 DEGREES
R AXIS: 63 DEGREES
T AXIS: 10 DEGREES
T AXIS: 14 DEGREES
T OFFSET: 390 MS
T OFFSET: 391 MS
VENTRICULAR RATE: 100 BPM
VENTRICULAR RATE: 99 BPM

## 2023-12-24 PROCEDURE — 96372 THER/PROPH/DIAG INJ SC/IM: CPT | Performed by: HOSPITALIST

## 2023-12-24 PROCEDURE — 99239 HOSP IP/OBS DSCHRG MGMT >30: CPT | Performed by: INTERNAL MEDICINE

## 2023-12-24 PROCEDURE — 1100000001 HC PRIVATE ROOM DAILY

## 2023-12-24 PROCEDURE — 2500000004 HC RX 250 GENERAL PHARMACY W/ HCPCS (ALT 636 FOR OP/ED): Performed by: INTERNAL MEDICINE

## 2023-12-24 PROCEDURE — 2500000004 HC RX 250 GENERAL PHARMACY W/ HCPCS (ALT 636 FOR OP/ED): Performed by: HOSPITALIST

## 2023-12-24 PROCEDURE — 2500000001 HC RX 250 WO HCPCS SELF ADMINISTERED DRUGS (ALT 637 FOR MEDICARE OP): Performed by: HOSPITALIST

## 2023-12-24 RX ADMIN — CEFTRIAXONE SODIUM 1 G: 1 INJECTION, SOLUTION INTRAVENOUS at 20:38

## 2023-12-24 RX ADMIN — TAMSULOSIN HYDROCHLORIDE 0.4 MG: 0.4 CAPSULE ORAL at 20:38

## 2023-12-24 RX ADMIN — POLYETHYLENE GLYCOL 3350 17 G: 17 POWDER, FOR SOLUTION ORAL at 08:56

## 2023-12-24 RX ADMIN — ENOXAPARIN SODIUM 40 MG: 40 INJECTION SUBCUTANEOUS at 20:45

## 2023-12-24 RX ADMIN — PANTOPRAZOLE SODIUM 40 MG: 40 TABLET, DELAYED RELEASE ORAL at 07:28

## 2023-12-24 RX ADMIN — Medication 3000 UNITS: at 07:28

## 2023-12-24 RX ADMIN — Medication 3000 UNITS: at 16:37

## 2023-12-24 RX ADMIN — PSYLLIUM HUSK 1 PACKET: 3.4 POWDER ORAL at 08:55

## 2023-12-24 RX ADMIN — Medication 1000 UNITS: at 08:56

## 2023-12-24 RX ADMIN — ATORVASTATIN CALCIUM 40 MG: 40 TABLET, FILM COATED ORAL at 20:38

## 2023-12-24 RX ADMIN — SERTRALINE HYDROCHLORIDE 100 MG: 50 TABLET ORAL at 07:28

## 2023-12-24 ASSESSMENT — COGNITIVE AND FUNCTIONAL STATUS - GENERAL
WALKING IN HOSPITAL ROOM: A LOT
DAILY ACTIVITIY SCORE: 14
HELP NEEDED FOR BATHING: A LITTLE
MOBILITY SCORE: 20
DAILY ACTIVITIY SCORE: 18
MOVING TO AND FROM BED TO CHAIR: A LOT
MOVING TO AND FROM BED TO CHAIR: A LITTLE
CLIMB 3 TO 5 STEPS WITH RAILING: A LOT
TOILETING: A LITTLE
PERSONAL GROOMING: A LITTLE
DRESSING REGULAR LOWER BODY CLOTHING: A LITTLE
DRESSING REGULAR UPPER BODY CLOTHING: A LITTLE
EATING MEALS: A LITTLE
WALKING IN HOSPITAL ROOM: A LITTLE
TURNING FROM BACK TO SIDE WHILE IN FLAT BAD: A LOT
STANDING UP FROM CHAIR USING ARMS: A LOT
DRESSING REGULAR UPPER BODY CLOTHING: A LOT
HELP NEEDED FOR BATHING: A LOT
CLIMB 3 TO 5 STEPS WITH RAILING: TOTAL
TOILETING: A LOT
EATING MEALS: A LITTLE
DRESSING REGULAR LOWER BODY CLOTHING: A LOT
MOBILITY SCORE: 13
PERSONAL GROOMING: A LITTLE

## 2023-12-24 ASSESSMENT — PAIN SCALES - GENERAL
PAINLEVEL_OUTOF10: 0 - NO PAIN
PAINLEVEL_OUTOF10: 0 - NO PAIN

## 2023-12-24 ASSESSMENT — PAIN - FUNCTIONAL ASSESSMENT: PAIN_FUNCTIONAL_ASSESSMENT: 0-10

## 2023-12-24 NOTE — CARE PLAN
The patient's goals for the shift include Patient remains comfortable    The clinical goals for the shift include Pt will remain hemodynamically stable by end of shift    Over the shift, the patient did not make progress toward the following goals.

## 2023-12-24 NOTE — CARE PLAN
"SW  was informed by nursing that although pt  was  supposed to  dc  yesterday nursing  at the  facility  refused to take him due to \"AMS\" - however  the  day shift  nurse at the  facility  said that pt has agitation at baseline. ANNA  sent  a  message in [facility] at  0845  but there was no response. ANNA  spoke   to  manager  Emiliana who  said that she would confer with team  and get back to this  writer.      Padma Rodríguez, MSW, LSW            0280       12-24-23   ANNA  was informed  by Emiliana  telephonically that pt  can  return. [facility], set up transport  for  3 pm with [facility]. ANNA  called Emiliana  at  [phone]  and let her know  of  time. ANNA  called jenni Weber and  let her know of the  time.   Padma Rodríguez, MSW, LSW    "

## 2023-12-24 NOTE — DISCHARGE SUMMARY
Discharge Diagnosis  Pneumonia of left lower lobe due to infectious organism    Issues Requiring Follow-Up  PCP follow up in 2 weeks    Discharge Meds     Your medication list        START taking these medications        Instructions Last Dose Given Next Dose Due   azithromycin 500 mg tablet  Commonly known as: Zithromax      Take 1 tablet (500 mg) by mouth once daily for 1 day.       cefdinir 300 mg capsule  Commonly known as: Omnicef      Take 1 capsule (300 mg) by mouth 2 times a day for 3 days.              CONTINUE taking these medications        Instructions Last Dose Given Next Dose Due   acetaminophen 325 mg tablet  Commonly known as: Tylenol           albuterol 90 mcg/actuation inhaler           cholecalciferol 25 MCG (1000 UT) tablet  Commonly known as: Vitamin D-3           gabapentin 100 mg capsule  Commonly known as: Neurontin           LACTASE ORAL           lisinopril 10 mg tablet           loperamide 2 mg capsule  Commonly known as: Imodium           LORazepam 1 mg tablet  Commonly known as: Ativan           melatonin 10 mg tablet           nicotine polacrilex 2 mg lozenge  Commonly known as: Commit           ondansetron 4 mg tablet  Commonly known as: Zofran           psyllium 3.4 gram packet  Commonly known as: Metamucil           sertraline 100 mg tablet  Commonly known as: Zoloft           tamsulosin 0.4 mg 24 hr capsule  Commonly known as: Flomax                     Where to Get Your Medications        These medications were sent to [facility]  [address], [place] [address]      Hours: 8 AM to 6 PM Mon-Fri, 9 AM to 1 PM Saturday Phone: [phone]   azithromycin 500 mg tablet  cefdinir 300 mg capsule         Test Results Pending At Discharge  Pending Labs       No current pending labs.            Hospital Course   Pneumonia of left lower lobe due to infectious organism  - Ceftriaxone and azithromycin  - Robitussin and tessalon perles for cough  - Joeonebs  prn  -Discharged on cefdinir and azithromycin     ELLY  - better      Afib  - not on AC  - not on antiarrhythmic or rate controlling medication  - telemetry     HTN  -  continue lisinopril     BPH  - flomax     patient is feeling much better today.  He will be discharged back to SNF.  Advised him to follow-up with his PCP as outpatient in 1 to 2 weeks.      Discharge time spent more than 30 minutes.       Pertinent Physical Exam At Time of Discharge  Physical Exam    Constitutional: No acute distress, awake, alert  Head/Neck: Neck supple  Respiratory/Thorax: Lungs are Clear to auscultation  Cardiovascular: Regular, rate and rhythm,  2+ equal pulses of the extremities, normal S 1and S 2  Gastrointestinal: Nondistended, soft, non-tender, no rebound tenderness or guarding  Extremities: No edema. No calf tenderness.  Neurological: Awake and alert. No focal neurological deficits  Psychological: Appropriate mood and behavior    Outpatient Follow-Up  No future appointments.      Oh Suresh MD   Soft

## 2023-12-25 NOTE — CARE PLAN
The patient's goals for the shift include Pt. will have a safe, restful and uneventful evening    The clinical goals for the shift include Discharge to [facility]    Problem: Fall/Injury  Goal: Not fall by end of shift  Outcome: Progressing  Goal: Be free from injury by end of the shift  Outcome: Progressing  Goal: Verbalize understanding of personal risk factors for fall in the hospital  Outcome: Progressing  Goal: Verbalize understanding of risk factor reduction measures to prevent injury from fall in the home  Outcome: Progressing  Goal: Use assistive devices by end of the shift  Outcome: Progressing  Goal: Pace activities to prevent fatigue by end of the shift  Outcome: Progressing     Problem: Respiratory  Goal: Clear secretions with interventions this shift  Outcome: Progressing  Goal: Minimize anxiety/maximize coping throughout shift  Outcome: Progressing  Goal: Minimal/no exertional discomfort or dyspnea this shift  Outcome: Progressing  Goal: No signs of respiratory distress (eg. Use of accessory muscles. Peds grunting)  Outcome: Progressing  Goal: Patent airway maintained this shift  Outcome: Progressing  Goal: Tolerate mechanical ventilation evidenced by VS/agitation level this shift  Outcome: Progressing  Goal: Tolerate pulmonary toileting this shift  Outcome: Progressing  Goal: Verbalize decreased shortness of breath this shift  Outcome: Progressing  Goal: Wean oxygen to maintain O2 saturation per order/standard this shift  Outcome: Progressing  Goal: Increase self care and/or family involvement in next 24 hours  Outcome: Progressing     Problem: Skin  Goal: Participates in plan/prevention/treatment measures  Outcome: Progressing  Goal: Prevent/manage excess moisture  Outcome: Progressing  Goal: Prevent/minimize sheer/friction injuries  Outcome: Progressing  Goal: Promote/optimize nutrition  Outcome: Progressing

## 2024-01-14 ENCOUNTER — APPOINTMENT (OUTPATIENT)
Dept: RADIOLOGY | Facility: HOSPITAL | Age: 79
End: 2024-01-14
Payer: COMMERCIAL

## 2024-01-14 ENCOUNTER — HOSPITAL ENCOUNTER (EMERGENCY)
Facility: HOSPITAL | Age: 79
Discharge: HOME | End: 2024-01-15
Attending: EMERGENCY MEDICINE
Payer: COMMERCIAL

## 2024-01-14 DIAGNOSIS — S09.90XA INJURY OF HEAD, INITIAL ENCOUNTER: Primary | ICD-10-CM

## 2024-01-14 PROCEDURE — 70450 CT HEAD/BRAIN W/O DYE: CPT

## 2024-01-14 PROCEDURE — 2500000004 HC RX 250 GENERAL PHARMACY W/ HCPCS (ALT 636 FOR OP/ED)

## 2024-01-14 PROCEDURE — 96372 THER/PROPH/DIAG INJ SC/IM: CPT

## 2024-01-14 PROCEDURE — 99284 EMERGENCY DEPT VISIT MOD MDM: CPT | Mod: 25 | Performed by: EMERGENCY MEDICINE

## 2024-01-14 PROCEDURE — 70450 CT HEAD/BRAIN W/O DYE: CPT | Performed by: RADIOLOGY

## 2024-01-14 RX ORDER — HALOPERIDOL 5 MG/ML
2.5 INJECTION INTRAMUSCULAR ONCE
Status: COMPLETED | OUTPATIENT
Start: 2024-01-14 | End: 2024-01-14

## 2024-01-14 RX ORDER — HALOPERIDOL 5 MG/ML
INJECTION INTRAMUSCULAR
Status: COMPLETED
Start: 2024-01-14 | End: 2024-01-14

## 2024-01-14 RX ADMIN — HALOPERIDOL 2.5 MG: 5 INJECTION INTRAMUSCULAR at 18:52

## 2024-01-14 RX ADMIN — HALOPERIDOL LACTATE 2.5 MG: 5 INJECTION, SOLUTION INTRAMUSCULAR at 18:52

## 2024-01-14 ASSESSMENT — PAIN SCALES - GENERAL
PAINLEVEL_OUTOF10: 0 - NO PAIN
PAINLEVEL_OUTOF10: 0 - NO PAIN

## 2024-01-14 ASSESSMENT — PAIN - FUNCTIONAL ASSESSMENT: PAIN_FUNCTIONAL_ASSESSMENT: WONG-BAKER FACES

## 2024-01-14 NOTE — ED TRIAGE NOTES
Pt BIBA with the c/o an extra crease to his forehead. Pt does not appear to be in any distress. Pt has a speech problem and this is not new.

## 2024-01-15 VITALS
HEART RATE: 91 BPM | RESPIRATION RATE: 20 BRPM | BODY MASS INDEX: 19.7 KG/M2 | OXYGEN SATURATION: 98 % | DIASTOLIC BLOOD PRESSURE: 87 MMHG | SYSTOLIC BLOOD PRESSURE: 144 MMHG | WEIGHT: 130 LBS | HEIGHT: 68 IN | TEMPERATURE: 98.4 F

## 2024-01-15 NOTE — ED PROVIDER NOTES
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  The nursing home thought he may have fallen and struck his head and they were complaining that they see a dent in his skull.  So I ordered a CAT scan of his head I do not see any ecchymosis or signs of tenderness to this area.  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  He has a very agitated patient and I see when he was discharged on [facility] on his discharge day they actually were utilizing Haldol for him.  Did receive some Haldol while in emergency and that columns him his CAT scan of his head showing the prior stroke defects does not show an acute process bleed or stroke.  He will be returned to the nursing home.  I spoke to his daughter she is eager to try to get him into a different facility unfortunately our social service team is not present at this time but she knows that she will be speaking to them about possible transfer for higher level of care.      PMH: Dementia    SH negative tobacco Alcohol  FH unknown heart disease Diabetes  ROS  General Appears in distress  HEENT: No sore throat, No Visual Loss, No Headache, No Ear Pain  Neck: Denies neck pain  Chest: No chest pain, no pleuritic pain, no chest wall injury  Pulmonary: No SOB, No Cough, No Sputum production, No Wheezing  GI: No abdominal pain, no nausea or vomiting, no diarrhea.  : No dysuria, no frequency, no hematuria.  Extremities: No musculoskeletal pain, normal ambulation, no paresthesia.  Psych: Normal interaction, no anxiety, no depression, no suicidal ideation  Skin: No rashes    ROS is otherwise negative    PE: General: Appears in distress        HEENT: Throat is moist without exudate, midline uvula  dentate intact, Tms clear with normal anatomy.        Neck: Supple non tender        Chest CTA, good AE, no wheezing, rales, or rhonci        CVA: RRR S1S2 no S3S4 or murmur        ABD: W/S/NT no HSM, no pulsatile masses, good bowel sounds        Extremities: Excellent distal pulses, brisk capillary refill. Full ROM        Psych: Normal interactions with no signs of depression  or suicidal ideation.        Neuro: Alert and oriented, moves all and feels all.    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  The nursing home thought he may have fallen and struck his head and they were complaining that they see a dent in his skull.  So I ordered a CAT scan of his head I do not see any ecchymosis or signs of tenderness to this area.  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  He has a very agitated patient and I see when he was discharged on [facility] on his discharge day they actually were utilizing Haldol for him.  Did receive some Haldol while in emergency and that columns him his CAT scan of his head showing the prior stroke defects does not show an acute process bleed or stroke.  He will be returned to the nursing home.  I spoke to his daughter she is eager to try to get him into a different facility unfortunately our social service team is not present at this time but she knows that she will be speaking to them about possible transfer for higher level of care.                            Garita Coma Scale Score: 15                  Patient History   Past Medical History:   Diagnosis Date    Personal history of other diseases of the circulatory system     History of hypertension    Personal history of other diseases of the circulatory system 06/04/2018    History of atrial fibrillation    Personal  history of other endocrine, nutritional and metabolic disease     History of hyperlipidemia    Personal history of other medical treatment     History of cardioversion    Unspecified atrial fibrillation (CMS/HCC)     Atrial fibrillation status post cardioversion     Past Surgical History:   Procedure Laterality Date    MR HEAD ANGIO WO IV CONTRAST  2/25/2023    MR HEAD ANGIO WO IV CONTRAST PAR MRI    MR NECK ANGIO WO IV CONTRAST  2/25/2023    MR NECK ANGIO WO IV CONTRAST PAR MRI     No family history on file.  Social History     Tobacco Use    Smoking status: Unknown     Passive exposure: Never (unable to understand)    Smokeless tobacco: Not on file   Vaping Use    Vaping Use: Unknown   Substance Use Topics    Alcohol use: Not on file    Drug use: Not on file     Comment: unable to understand       Physical Exam   ED Triage Vitals [01/14/24 1618]   Temp Heart Rate Resp BP   36.9 °C (98.4 °F) 91 18 (!) 138/93      SpO2 Temp src Heart Rate Source Patient Position   98 % -- -- --      BP Location FiO2 (%)     -- --       Physical Exam    ED Course & MDM   Diagnoses as of 01/14/24 2229   Injury of head, initial encounter       Medical Decision Making      Procedure  Procedures     Vince Henao MD  01/14/24 2233

## 2024-01-21 ENCOUNTER — APPOINTMENT (OUTPATIENT)
Dept: RADIOLOGY | Facility: HOSPITAL | Age: 79
End: 2024-01-21
Payer: COMMERCIAL

## 2024-01-21 ENCOUNTER — HOSPITAL ENCOUNTER (EMERGENCY)
Facility: HOSPITAL | Age: 79
Discharge: HOME | End: 2024-01-22
Attending: INTERNAL MEDICINE
Payer: COMMERCIAL

## 2024-01-21 ENCOUNTER — APPOINTMENT (OUTPATIENT)
Dept: CARDIOLOGY | Facility: HOSPITAL | Age: 79
End: 2024-01-21
Payer: COMMERCIAL

## 2024-01-21 DIAGNOSIS — S51.001A AVULSION OF SKIN OF RIGHT ELBOW, INITIAL ENCOUNTER: ICD-10-CM

## 2024-01-21 DIAGNOSIS — N17.9 AKI (ACUTE KIDNEY INJURY) (CMS-HCC): Primary | ICD-10-CM
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
## 2024-01-21 DIAGNOSIS — S01.91XA LACERATION OF HEAD WITHOUT FOREIGN BODY, UNSPECIFIED PART OF HEAD, INITIAL ENCOUNTER: ICD-10-CM

## 2024-01-21 LAB
ALBUMIN SERPL BCP-MCNC: 3.5 G/DL (ref 3.4–5)
ALP SERPL-CCNC: 94 U/L (ref 33–136)
ALT SERPL W P-5'-P-CCNC: 8 U/L (ref 10–52)
ANION GAP SERPL CALC-SCNC: 13 MMOL/L (ref 10–20)
APPEARANCE UR: CLEAR
AST SERPL W P-5'-P-CCNC: 15 U/L (ref 9–39)
BACTERIA #/AREA URNS AUTO: ABNORMAL /HPF
BASOPHILS # BLD AUTO: 0.04 X10*3/UL (ref 0–0.1)
BASOPHILS NFR BLD AUTO: 0.5 %
BILIRUB SERPL-MCNC: 0.6 MG/DL (ref 0–1.2)
BILIRUB UR STRIP.AUTO-MCNC: NEGATIVE MG/DL
BNP SERPL-MCNC: 125 PG/ML (ref 0–99)
BUN SERPL-MCNC: 29 MG/DL (ref 6–23)
CALCIUM SERPL-MCNC: 8.9 MG/DL (ref 8.6–10.3)
CAOX CRY #/AREA UR COMP ASSIST: ABNORMAL /HPF
CARDIAC TROPONIN I PNL SERPL HS: 14 NG/L (ref 0–20)
CHLORIDE SERPL-SCNC: 106 MMOL/L (ref 98–107)
CO2 SERPL-SCNC: 25 MMOL/L (ref 21–32)
COLOR UR: YELLOW
CREAT SERPL-MCNC: 1.32 MG/DL (ref 0.5–1.3)
EGFRCR SERPLBLD CKD-EPI 2021: 55 ML/MIN/1.73M*2
EOSINOPHIL # BLD AUTO: 0.07 X10*3/UL (ref 0–0.4)
EOSINOPHIL NFR BLD AUTO: 0.8 %
ERYTHROCYTE [DISTWIDTH] IN BLOOD BY AUTOMATED COUNT: 14.1 % (ref 11.5–14.5)
GLUCOSE SERPL-MCNC: 122 MG/DL (ref 74–99)
GLUCOSE UR STRIP.AUTO-MCNC: NEGATIVE MG/DL
HCT VFR BLD AUTO: 40.3 % (ref 41–52)
HGB BLD-MCNC: 13.1 G/DL (ref 13.5–17.5)
HYALINE CASTS #/AREA URNS AUTO: ABNORMAL /LPF
IMM GRANULOCYTES # BLD AUTO: 0.04 X10*3/UL (ref 0–0.5)
IMM GRANULOCYTES NFR BLD AUTO: 0.5 % (ref 0–0.9)
KETONES UR STRIP.AUTO-MCNC: NEGATIVE MG/DL
LEUKOCYTE ESTERASE UR QL STRIP.AUTO: ABNORMAL
LYMPHOCYTES # BLD AUTO: 0.96 X10*3/UL (ref 0.8–3)
LYMPHOCYTES NFR BLD AUTO: 11.4 %
MCH RBC QN AUTO: 30.3 PG (ref 26–34)
MCHC RBC AUTO-ENTMCNC: 32.5 G/DL (ref 32–36)
MCV RBC AUTO: 93 FL (ref 80–100)
MONOCYTES # BLD AUTO: 0.42 X10*3/UL (ref 0.05–0.8)
MONOCYTES NFR BLD AUTO: 5 %
MUCOUS THREADS #/AREA URNS AUTO: ABNORMAL /LPF
NEUTROPHILS # BLD AUTO: 6.88 X10*3/UL (ref 1.6–5.5)
NEUTROPHILS NFR BLD AUTO: 81.8 %
NITRITE UR QL STRIP.AUTO: NEGATIVE
NRBC BLD-RTO: 0 /100 WBCS (ref 0–0)
PH UR STRIP.AUTO: 6 [PH]
PLATELET # BLD AUTO: 210 X10*3/UL (ref 150–450)
POTASSIUM SERPL-SCNC: 4.6 MMOL/L (ref 3.5–5.3)
PROT SERPL-MCNC: 6.3 G/DL (ref 6.4–8.2)
PROT UR STRIP.AUTO-MCNC: NEGATIVE MG/DL
RBC # BLD AUTO: 4.32 X10*6/UL (ref 4.5–5.9)
RBC # UR STRIP.AUTO: NEGATIVE /UL
RBC #/AREA URNS AUTO: ABNORMAL /HPF
SODIUM SERPL-SCNC: 139 MMOL/L (ref 136–145)
SP GR UR STRIP.AUTO: 1.01
SQUAMOUS #/AREA URNS AUTO: ABNORMAL /HPF
UROBILINOGEN UR STRIP.AUTO-MCNC: <2 MG/DL
WBC # BLD AUTO: 8.4 X10*3/UL (ref 4.4–11.3)
WBC #/AREA URNS AUTO: ABNORMAL /HPF

## 2024-01-21 PROCEDURE — 71046 X-RAY EXAM CHEST 2 VIEWS: CPT | Performed by: STUDENT IN AN ORGANIZED HEALTH CARE EDUCATION/TRAINING PROGRAM

## 2024-01-21 PROCEDURE — 93005 ELECTROCARDIOGRAM TRACING: CPT

## 2024-01-21 PROCEDURE — 73521 X-RAY EXAM HIPS BI 2 VIEWS: CPT

## 2024-01-21 PROCEDURE — 72125 CT NECK SPINE W/O DYE: CPT

## 2024-01-21 PROCEDURE — 73523 X-RAY EXAM HIPS BI 5/> VIEWS: CPT | Mod: BILATERAL PROCEDURE | Performed by: STUDENT IN AN ORGANIZED HEALTH CARE EDUCATION/TRAINING PROGRAM

## 2024-01-21 PROCEDURE — 2500000005 HC RX 250 GENERAL PHARMACY W/O HCPCS: Performed by: INTERNAL MEDICINE

## 2024-01-21 PROCEDURE — 96361 HYDRATE IV INFUSION ADD-ON: CPT | Mod: 59

## 2024-01-21 PROCEDURE — 2500000001 HC RX 250 WO HCPCS SELF ADMINISTERED DRUGS (ALT 637 FOR MEDICARE OP): Performed by: INTERNAL MEDICINE

## 2024-01-21 PROCEDURE — 12001 RPR S/N/AX/GEN/TRNK 2.5CM/<: CPT | Performed by: INTERNAL MEDICINE

## 2024-01-21 PROCEDURE — 36415 COLL VENOUS BLD VENIPUNCTURE: CPT | Performed by: INTERNAL MEDICINE

## 2024-01-21 PROCEDURE — 73080 X-RAY EXAM OF ELBOW: CPT | Mod: RT

## 2024-01-21 PROCEDURE — 2500000004 HC RX 250 GENERAL PHARMACY W/ HCPCS (ALT 636 FOR OP/ED): Performed by: INTERNAL MEDICINE

## 2024-01-21 PROCEDURE — 99285 EMERGENCY DEPT VISIT HI MDM: CPT | Mod: 25

## 2024-01-21 PROCEDURE — 72125 CT NECK SPINE W/O DYE: CPT | Performed by: STUDENT IN AN ORGANIZED HEALTH CARE EDUCATION/TRAINING PROGRAM

## 2024-01-21 PROCEDURE — 70450 CT HEAD/BRAIN W/O DYE: CPT | Performed by: STUDENT IN AN ORGANIZED HEALTH CARE EDUCATION/TRAINING PROGRAM

## 2024-01-21 PROCEDURE — 71046 X-RAY EXAM CHEST 2 VIEWS: CPT

## 2024-01-21 PROCEDURE — 85025 COMPLETE CBC W/AUTO DIFF WBC: CPT | Performed by: INTERNAL MEDICINE

## 2024-01-21 PROCEDURE — 90471 IMMUNIZATION ADMIN: CPT | Performed by: INTERNAL MEDICINE

## 2024-01-21 PROCEDURE — 90715 TDAP VACCINE 7 YRS/> IM: CPT | Performed by: INTERNAL MEDICINE

## 2024-01-21 PROCEDURE — 96360 HYDRATION IV INFUSION INIT: CPT | Mod: 59

## 2024-01-21 PROCEDURE — 84075 ASSAY ALKALINE PHOSPHATASE: CPT | Performed by: INTERNAL MEDICINE

## 2024-01-21 PROCEDURE — 83880 ASSAY OF NATRIURETIC PEPTIDE: CPT | Performed by: INTERNAL MEDICINE

## 2024-01-21 PROCEDURE — 84484 ASSAY OF TROPONIN QUANT: CPT | Performed by: INTERNAL MEDICINE

## 2024-01-21 PROCEDURE — 70450 CT HEAD/BRAIN W/O DYE: CPT | Mod: 59

## 2024-01-21 PROCEDURE — 81001 URINALYSIS AUTO W/SCOPE: CPT | Performed by: INTERNAL MEDICINE

## 2024-01-21 PROCEDURE — 73080 X-RAY EXAM OF ELBOW: CPT | Mod: RIGHT SIDE | Performed by: STUDENT IN AN ORGANIZED HEALTH CARE EDUCATION/TRAINING PROGRAM

## 2024-01-21 PROCEDURE — 87086 URINE CULTURE/COLONY COUNT: CPT | Mod: AHULAB | Performed by: INTERNAL MEDICINE

## 2024-01-21 RX ORDER — BACITRACIN ZINC 500 UNIT/G
1 OINTMENT IN PACKET (EA) TOPICAL ONCE
Status: COMPLETED | OUTPATIENT
Start: 2024-01-21 | End: 2024-01-21

## 2024-01-21 RX ORDER — LIDOCAINE HYDROCHLORIDE AND EPINEPHRINE 10; 10 MG/ML; UG/ML
10 INJECTION, SOLUTION INFILTRATION; PERINEURAL ONCE
Status: COMPLETED | OUTPATIENT
Start: 2024-01-21 | End: 2024-01-21

## 2024-01-21 RX ADMIN — SODIUM CHLORIDE 1000 ML: 9 INJECTION, SOLUTION INTRAVENOUS at 17:46

## 2024-01-21 RX ADMIN — TETANUS TOXOID, REDUCED DIPHTHERIA TOXOID AND ACELLULAR PERTUSSIS VACCINE, ADSORBED 0.5 ML: 5; 2.5; 8; 8; 2.5 SUSPENSION INTRAMUSCULAR at 17:12

## 2024-01-21 RX ADMIN — LIDOCAINE HYDROCHLORIDE,EPINEPHRINE BITARTRATE 10 ML: 10; .01 INJECTION, SOLUTION INFILTRATION; PERINEURAL at 17:12

## 2024-01-21 RX ADMIN — BACITRACIN 1 APPLICATION: 500 OINTMENT TOPICAL at 23:01

## 2024-01-21 ASSESSMENT — COLUMBIA-SUICIDE SEVERITY RATING SCALE - C-SSRS
2. HAVE YOU ACTUALLY HAD ANY THOUGHTS OF KILLING YOURSELF?: NO
6. HAVE YOU EVER DONE ANYTHING, STARTED TO DO ANYTHING, OR PREPARED TO DO ANYTHING TO END YOUR LIFE?: NO
1. IN THE PAST MONTH, HAVE YOU WISHED YOU WERE DEAD OR WISHED YOU COULD GO TO SLEEP AND NOT WAKE UP?: NO

## 2024-01-21 NOTE — ED TRIAGE NOTES
Pt here via ems from [facility]. Pt had an unwitnessed fall In his room. Pt has laceration to right side of forehead and a possible skin tear to right elbow. Pt has dementia. No thinners noted.

## 2024-01-21 NOTE — ED PROVIDER NOTES
HPI     CC: Fall     HPI: Rodri Whitfield is a 78 y.o. 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  Patient is unable to provide a history due to expressive aphasia.  Per triage note, he had an unwitnessed fall in his room at his nursing facility, has a laceration to the right side of his forehead and possible skin tear to the right elbow.    ROS: 10-point review of systems was performed and is otherwise negative except as noted in HPI.    Limitations to history: Aphasia    Independent Historians: N/A    External Records Reviewed: Recent DC summary    Past Medical History: Noncontributory except per HPI     Past Surgical History: Noncontributory except per HPI     Family History: Reviewed and noncontributory     Social History: No known toxic habits    Social Determinants Affecting Care: N/A    No Known Allergies    Home Meds:   Current Outpatient Medications   Medication Instructions    acetaminophen (Tylenol) 325 mg tablet 2 tablets, oral, Every 6 hours PRN    albuterol 90 mcg/actuation inhaler 2 puffs, inhalation, Every 4 hours PRN    cholecalciferol (Vitamin D-3) 25 MCG (1000 UT) tablet 1 tablet, oral, Daily RT    gabapentin (NEURONTIN) 100 mg, oral, 2 times daily, For neuropathy.    lactase (Lactaid) 3,000 unit tablet 1 tablet, oral, 3 times daily before meals, For dairy intolerance.    lisinopril 10 mg, oral, Daily RT    loperamide (IMODIUM) 2 mg, oral, Every 8 hours PRN    LORazepam (Ativan) 1 mg tablet 1 tablet, oral, Every 8 hours PRN    melatonin 10 mg tablet 1 tablet, oral, Nightly PRN    nicotine polacrilex (Commit) 2 mg lozenge 1 lozenge, oral, Every 4 hours PRN    ondansetron (Zofran) 4 mg tablet 1 tablet, oral, Every 4 hours PRN    psyllium (Metamucil) 3.4 gram packet 1 packet, oral, Daily    sertraline (Zoloft) 100 mg tablet 1 tablet, oral, Daily RT, FOR DEPRESSION    tamsulosin  "(Flomax) 0.4 mg 24 hr capsule 1 capsule, oral, Nightly        Physical Exam     ED Triage Vitals [01/21/24 1616]   Temp Heart Rate Respirations BP   36.6 °C (97.8 °F) 81 16 114/78      Pulse Ox Temp src Heart Rate Source Patient Position   97 % -- -- --      BP Location FiO2 (%)     -- --         Heart Rate:  []   Temp:  [36.6 °C (97.8 °F)]   Respirations:  [16-20]   BP: (114-146)/(75-82)   Height:  [170.2 cm (5' 7\")]   Weight:  [56.7 kg (125 lb)]   Pulse Ox:  [97 %-99 %]      Physical Exam  Vitals and nursing note reviewed.     CONSTITUTIONAL: Well appearing, well nourished, in no acute distress.   HENMT: Head with 2 cm irregular superficial laceration to right temple/forehead. No facial or scalp TTP. Airway patent. Nasal mucosa clear. Mouth with normal mucosa, clear oropharynx. Uvula midline. TMs clear bilaterally with no hemotympanum. Neck supple.    EYES: Clear bilaterally. No periorbital TTP.  Pupils equally round and reactive to light, extraocular movements grossly intact.    CARDIOVASCULAR: Normal rate, regular rhythm.  Heart sounds S1, S2.  No murmurs, rubs or gallops. Normal pulses. Capillary refill <2 sec.   RESPIRATORY: No increased work of breathing. Breath sounds clear and equal bilaterally.  GASTROINTESTINAL: Abdomen soft, non-distended, non-tender. No rebound, no guarding. Bowel sounds normal in all 4 quadrants. No palpable masses.   GENITOURINARY:  No CVA tenderness.  MUSCULOSKELETAL: 1 cm skin tear to R elbow, no bony deformity or significant TTP.  No midline C/T/L TTP or stepoffs. No other muscle or joint deformity or TTP. No edema.  NEUROLOGICAL: GCS 14.  Oriented only to self.  Alert. R-sided weakness (baseline)  SKIN: Warm, dry and intact. No rash. No iqbal sign, raccoon eyes or other notable skin lesions except as noted above.   PSYCHIATRIC: Normal mood and affect.  HEME/LYMPH: No adenopathy or splenomegaly.    Diagnostic Results      ECG: ECGs read and interpreted by me. See ED Course, " below, for interpretation.    Labs Reviewed   CBC WITH AUTO DIFFERENTIAL - Abnormal       Result Value    WBC 8.4      nRBC 0.0      RBC 4.32 (*)     Hemoglobin 13.1 (*)     Hematocrit 40.3 (*)     MCV 93      MCH 30.3      MCHC 32.5      RDW 14.1      Platelets 210      Neutrophils % 81.8      Immature Granulocytes %, Automated 0.5      Lymphocytes % 11.4      Monocytes % 5.0      Eosinophils % 0.8      Basophils % 0.5      Neutrophils Absolute 6.88 (*)     Immature Granulocytes Absolute, Automated 0.04      Lymphocytes Absolute 0.96      Monocytes Absolute 0.42      Eosinophils Absolute 0.07      Basophils Absolute 0.04     COMPREHENSIVE METABOLIC PANEL - Abnormal    Glucose 122 (*)     Sodium 139      Potassium 4.6      Chloride 106      Bicarbonate 25      Anion Gap 13      Urea Nitrogen 29 (*)     Creatinine 1.32 (*)     eGFR 55 (*)     Calcium 8.9      Albumin 3.5      Alkaline Phosphatase 94      Total Protein 6.3 (*)     AST 15      Bilirubin, Total 0.6      ALT 8 (*)    B-TYPE NATRIURETIC PEPTIDE - Abnormal     (*)     Narrative:        <100 pg/mL - Heart failure unlikely  100-299 pg/mL - Intermediate probability of acute heart                  failure exacerbation. Correlate with clinical                  context and patient history.    >=300 pg/mL - Heart Failure likely. Correlate with clinical                  context and patient history.    BNP testing is performed using different testing methodology at [facility] than at other [facility] hospitals. Direct result comparisons should only be made within the same method.      URINALYSIS WITH REFLEX CULTURE AND MICROSCOPIC - Abnormal    Color, Urine Yellow      Appearance, Urine Clear      Specific Gravity, Urine 1.014      pH, Urine 6.0      Protein, Urine NEGATIVE      Glucose, Urine NEGATIVE      Blood, Urine NEGATIVE      Ketones, Urine NEGATIVE      Bilirubin, Urine NEGATIVE      Urobilinogen, Urine <2.0      Nitrite, Urine NEGATIVE       Leukocyte Esterase, Urine MODERATE (2+) (*)    MICROSCOPIC ONLY, URINE - Abnormal    WBC, Urine 1-5      RBC, Urine 1-2      Squamous Epithelial Cells, Urine 1-9 (SPARSE)      Bacteria, Urine 1+ (*)     Mucus, Urine 1+      Hyaline Casts, Urine OCCASIONAL (*)     Calcium Oxalate Crystals, Urine 2+ (*)    TROPONIN I, HIGH SENSITIVITY - Normal    Troponin I, High Sensitivity 14      Narrative:     Less than 99th percentile of normal range cutoff-  Female and children under 18 years old <14 ng/L; Male <21 ng/L: Negative  Repeat testing should be performed if clinically indicated.     Female and children under 18 years old 14-50 ng/L; Male 21-50 ng/L:  Consistent with possible cardiac damage and possible increased clinical   risk. Serial measurements may help to assess extent of myocardial damage.     >50 ng/L: Consistent with cardiac damage, increased clinical risk and  myocardial infarction. Serial measurements may help assess extent of   myocardial damage.      NOTE: Children less than 1 year old may have higher baseline troponin   levels and results should be interpreted in conjunction with the overall   clinical context.     NOTE: Troponin I testing is performed using a different   testing methodology at [facility] than at other   [facility]. Direct result comparisons should only   be made within the same method.   URINE CULTURE   URINALYSIS WITH REFLEX CULTURE AND MICROSCOPIC    Narrative:     The following orders were created for panel order Urinalysis with Reflex Culture and Microscopic.  Procedure                               Abnormality         Status                     ---------                               -----------         ------                     Urinalysis with Reflex C...[[id number]]  Abnormal            Final result               Extra Urine Gray Tube[[id number]]                            In process                   Please view results for these tests on the individual  orders.   EXTRA URINE GRAY TUBE         CT head wo IV contrast   Final Result   CT HEAD:        1. No evidence of new hemorrhage, depressed skull fracture, or other   acute intracranial abnormality.   2. Postsurgical changes of left craniotomy and aneurysms clipping   near the left clinoid process, with similar appearance of the large   area of cystic encephalomalacia and gliosis in the left cerebral   hemisphere compared to prior examination.             CT C-SPINE:        1. No evidence of new trauma to the cervical spine.   2. Multilevel degenerative changes are present in the cervical spine,   with mild spinal canal and neural foraminal narrowing present at   several levels due to disc osteophyte complexes and ligamentum flavum   thickening.             MACRO:   None        Signed by: Rosendo Sebastian 1/21/2024 8:28 PM   Dictation workstation:   [id number]      CT cervical spine wo IV contrast   Final Result   CT HEAD:        1. No evidence of new hemorrhage, depressed skull fracture, or other   acute intracranial abnormality.   2. Postsurgical changes of left craniotomy and aneurysms clipping   near the left clinoid process, with similar appearance of the large   area of cystic encephalomalacia and gliosis in the left cerebral   hemisphere compared to prior examination.             CT C-SPINE:        1. No evidence of new trauma to the cervical spine.   2. Multilevel degenerative changes are present in the cervical spine,   with mild spinal canal and neural foraminal narrowing present at   several levels due to disc osteophyte complexes and ligamentum flavum   thickening.             MACRO:   None        Signed by: Rosendo Sebastian 1/21/2024 8:28 PM   Dictation workstation:   [id number]      XR chest 2 views   Final Result   1.  Mild bibasilar atelectasis without evidence of consolidation or   pleural effusion.                  MACRO:   None        Signed by: Rosendo Sebastian 1/21/2024 8:16 PM    Dictation workstation:   [id number]      XR hips bilateral 2 VW w pelvis when performed   Final Result   1.  No evidence of fracture or traumatic malalignment of the pelvis.   2. Degenerative changes are present at the hips bilaterally with   joint space narrowing and osteophytosis.                  MACRO:   None        Signed by: Rosendo Sebastian 1/21/2024 8:34 PM   Dictation workstation:   [id number]      XR elbow right 3+ views   Final Result   No evidence of fracture or traumatic malalignment of the right elbow.             MACRO:   None        Signed by: Rosendo Sebastian 1/21/2024 8:35 PM   Dictation workstation:   [id number]                    [place] Coma Scale Score: 14                  Procedure  Laceration Repair    Performed by: Roya Garrido MD  Authorized by: Roya Garrido MD    Consent:     Consent obtained:  Verbal and emergent situation    Risks, benefits, and alternatives were discussed: yes    Universal protocol:     Patient identity confirmed:  Arm band  Anesthesia:     Anesthesia method:  Local infiltration    Local anesthetic:  Lidocaine 1% WITH epi  Laceration details:     Location:  Scalp    Scalp location:  R temporal    Length (cm):  2  Pre-procedure details:     Preparation:  Patient was prepped and draped in usual sterile fashion and imaging obtained to evaluate for foreign bodies  Exploration:     Hemostasis achieved with:  Direct pressure    Imaging obtained comment:  CT    Wound exploration: wound explored through full range of motion and entire depth of wound visualized      Wound extent: no fascia violation noted and no foreign bodies/material noted      Contaminated: no    Treatment:     Area cleansed with:  Saline    Amount of cleaning:  Standard    Irrigation solution:  Sterile saline    Irrigation method:  Pressure wash    Debridement:  None    Scar revision: no    Skin repair:     Repair method:  Sutures    Suture size:  5-0    Suture material:   Prolene    Suture technique:  Simple interrupted    Number of sutures:  5  Approximation:     Approximation:  Close  Repair type:     Repair type:  Simple  Post-procedure details:     Dressing:  Antibiotic ointment and non-adherent dressing    Procedure completion:  Tolerated well, no immediate complications      ED Course & MDM   Assessment/Plan:   Rodri Whitfield is a 78 y.o. 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  Patient unable to provide a history due to expressive aphasia.  Full trauma exam was performed and notable for lacerations to the right forehead and elbow.  No other traumatic injury apparent on exam. See below for details of ED course and ultimate disposition.    Medications   diphth,pertus(acell),tetanus (BoostRIX) 2.5-8-5 Lf-mcg-Lf/0.5mL vaccine 0.5 mL (0.5 mL intramuscular Given 1/21/24 1712)   lidocaine-epinephrine (Xylocaine W/EPI) 1 %-1:100,000 injection 10 mL (10 mL infiltration Given 1/21/24 1712)   sodium chloride 0.9 % bolus 1,000 mL (0 mL intravenous Stopped 1/21/24 2114)        ED Course as of 01/21/24 2247   Sun Jan 21, 2024   1709 ECG read interpreted by me.  Atrial fibrillation with RVR, rate 104.  PVC present.  Normal axis.  Normal intervals.  No ST or T wave derangements. [CG]   1946 Spoke with nursing home. Patient has frequent falls, fell earlier today and had a gash on the right side of his head. STNMARGRET was gathering his dirty linen, turned her back and he fell. His blood pressure was elevated but otherwise has been at his baseline as far as she knows. He is falling every day and they are concerned he needs 1:1 observation.  [CG]   2020 CXR 1.  Mild bibasilar atelectasis without evidence of consolidation or pleural effusion.   [CG]   2037 XR elbow No evidence of fracture or traumatic malalignment of the right elbow. [CG]   2037 XR  hip 1.  No evidence of fracture or traumatic malalignment of the pelvis.  2. Degenerative changes are present at the hips bilaterally with  joint space narrowing and osteophytosis.   [CG]   2038 CTH 1. No evidence of new hemorrhage, depressed skull fracture, or other  acute intracranial abnormality.  2. Postsurgical changes of left craniotomy and aneurysms clipping  near the left clinoid process, with similar appearance of the large  area of cystic encephalomalacia and gliosis in the left cerebral  hemisphere compared to prior examination.   [CG]   2038 CT CS 1. No evidence of new trauma to the cervical spine.  2. Multilevel degenerative changes are present in the cervical spine,  with mild spinal canal and neural foraminal narrowing present at  several levels due to disc osteophyte complexes and ligamentum flavum  thickening.   [CG]   2140 UA negative for infection.  [CG]   2246 Head laceration cleansed and closed with 5 5-0 sutures. Elbow avulsion left to heal by secondary intention.  [CG]   2246 Patient was discharged back to his facility.  [CG]      ED Course User Index  [CG] Roya Garrido MD         Diagnoses as of 01/21/24 2247   ELLY (acute kidney injury) (CMS/[facility])   Fall, initial encounter   Laceration of head without foreign body, unspecified part of head, initial encounter   Avulsion of skin of right elbow, initial encounter       Disposition:   DISCHARGE.  The patient was discharged with both verbal and written anticipatory guidance and strict return precautions. Discharge diagnosis, instructions and plan were discussed and understood. I emphasized the importance of following up with their primary care provider within 24-48 hours and with any referrals in the timeframe recommended. At the time of discharge the patient was comfortable and was in no apparent distress. Patient is aware of diagnostic uncertainty and was notified though testing is negative here, there is a very small chance that pathology  may be missed.  The patient understands these risks and the patient/family understood to call 911 or return immediately to the emergency department if the symptoms worsen or if they have any additional concerns.      FOLLOW UP  Primary care provider in 1-2 days.      ED Prescriptions    None         Roya Garrido MD  EM/IM/Peds    This note was dictated by speech recognition. Minor errors in transcription may be present.     Roya Garrido MD  01/21/24 [id number]

## 2024-01-22 ENCOUNTER — APPOINTMENT (OUTPATIENT)
Dept: PRIMARY CARE | Facility: CLINIC | Age: 79
End: 2024-01-22
Payer: COMMERCIAL

## 2024-01-22 VITALS
WEIGHT: 125 LBS | TEMPERATURE: 97.8 F | OXYGEN SATURATION: 98 % | HEIGHT: 67 IN | RESPIRATION RATE: 18 BRPM | HEART RATE: 89 BPM | DIASTOLIC BLOOD PRESSURE: 88 MMHG | BODY MASS INDEX: 19.62 KG/M2 | SYSTOLIC BLOOD PRESSURE: 139 MMHG

## 2024-01-22 LAB
HOLD SPECIMEN: NORMAL
Q ONSET: 224 MS
QRS COUNT: 17 BEATS
QRS DURATION: 76 MS
QT INTERVAL: 334 MS
QTC CALCULATION(BAZETT): 439 MS
QTC FREDERICIA: 401 MS
R AXIS: 63 DEGREES
T AXIS: 17 DEGREES
T OFFSET: 391 MS
VENTRICULAR RATE: 104 BPM

## 2024-01-22 NOTE — DISCHARGE INSTRUCTIONS
You were seen today for falls with head laceration and elbow laceration. Your head laceration was repaired with 5 sutures. They will need to be removed in 5-7 days.  Your elbow did not need repair. Your evaluation was not concerning for an emergency at this time. Please see the attached information sheet for information about your condition, how to care for your condition at home, and reasons to return to the emergency department. Take any prescriptions written today as prescribed. You should call your primary care provider within 24 hours to tell them about today's visit, including any new medications or medication changes, as he or she may want to see you in the office for further evaluation. If you do not have a primary care provider, call  [phone] for an appointment. We offer in-person office visits as well as virtual options. Please do not hesitate to call  [phone] or return to the emergency department with any new or unresolved concerns or symptoms. Thank you for choosing [facility] for your care.

## 2024-01-22 NOTE — ED NOTES
Pt attempting to get out of bed and leave. Charge nurse and supervisor aware of needing a safety sitter for pt.      Razia Glasgow RN  01/21/24 [id number]

## 2024-01-23 LAB — BACTERIA UR CULT: NO GROWTH

## 2024-01-26 ENCOUNTER — APPOINTMENT (OUTPATIENT)
Dept: RADIOLOGY | Facility: HOSPITAL | Age: 79
End: 2024-01-26
Payer: COMMERCIAL

## 2024-01-26 ENCOUNTER — HOSPITAL ENCOUNTER (EMERGENCY)
Facility: HOSPITAL | Age: 79
Discharge: HOME | End: 2024-01-27
Attending: EMERGENCY MEDICINE
Payer: COMMERCIAL

## 2024-01-26 ENCOUNTER — HOSPITAL ENCOUNTER (OUTPATIENT)
Dept: CARDIOLOGY | Facility: HOSPITAL | Age: 79
Discharge: HOME | End: 2024-01-26
Payer: COMMERCIAL

## 2024-01-26 DIAGNOSIS — S09.90XA HEAD INJURY, INITIAL ENCOUNTER: Primary | ICD-10-CM

## 2024-01-26 LAB
ALBUMIN SERPL BCP-MCNC: 3.6 G/DL (ref 3.4–5)
ALP SERPL-CCNC: 98 U/L (ref 33–136)
ALT SERPL W P-5'-P-CCNC: 8 U/L (ref 10–52)
ANION GAP SERPL CALC-SCNC: 12 MMOL/L (ref 10–20)
APPEARANCE UR: ABNORMAL
AST SERPL W P-5'-P-CCNC: 15 U/L (ref 9–39)
BASOPHILS # BLD AUTO: 0.04 X10*3/UL (ref 0–0.1)
BASOPHILS NFR BLD AUTO: 0.5 %
BILIRUB SERPL-MCNC: 0.5 MG/DL (ref 0–1.2)
BILIRUB UR STRIP.AUTO-MCNC: NEGATIVE MG/DL
BUN SERPL-MCNC: 22 MG/DL (ref 6–23)
CALCIUM SERPL-MCNC: 9 MG/DL (ref 8.6–10.3)
CARDIAC TROPONIN I PNL SERPL HS: 12 NG/L (ref 0–20)
CHLORIDE SERPL-SCNC: 106 MMOL/L (ref 98–107)
CO2 SERPL-SCNC: 27 MMOL/L (ref 21–32)
COLOR UR: YELLOW
CREAT SERPL-MCNC: 1.1 MG/DL (ref 0.5–1.3)
EGFRCR SERPLBLD CKD-EPI 2021: 69 ML/MIN/1.73M*2
EOSINOPHIL # BLD AUTO: 0.17 X10*3/UL (ref 0–0.4)
EOSINOPHIL NFR BLD AUTO: 2 %
ERYTHROCYTE [DISTWIDTH] IN BLOOD BY AUTOMATED COUNT: 14.3 % (ref 11.5–14.5)
GLUCOSE SERPL-MCNC: 93 MG/DL (ref 74–99)
GLUCOSE UR STRIP.AUTO-MCNC: NEGATIVE MG/DL
HCT VFR BLD AUTO: 41.9 % (ref 41–52)
HGB BLD-MCNC: 13.2 G/DL (ref 13.5–17.5)
IMM GRANULOCYTES # BLD AUTO: 0.04 X10*3/UL (ref 0–0.5)
IMM GRANULOCYTES NFR BLD AUTO: 0.5 % (ref 0–0.9)
KETONES UR STRIP.AUTO-MCNC: NEGATIVE MG/DL
LEUKOCYTE ESTERASE UR QL STRIP.AUTO: NEGATIVE
LYMPHOCYTES # BLD AUTO: 1.31 X10*3/UL (ref 0.8–3)
LYMPHOCYTES NFR BLD AUTO: 15.2 %
MAGNESIUM SERPL-MCNC: 1.8 MG/DL (ref 1.6–2.4)
MCH RBC QN AUTO: 29.9 PG (ref 26–34)
MCHC RBC AUTO-ENTMCNC: 31.5 G/DL (ref 32–36)
MCV RBC AUTO: 95 FL (ref 80–100)
MONOCYTES # BLD AUTO: 0.47 X10*3/UL (ref 0.05–0.8)
MONOCYTES NFR BLD AUTO: 5.4 %
NEUTROPHILS # BLD AUTO: 6.61 X10*3/UL (ref 1.6–5.5)
NEUTROPHILS NFR BLD AUTO: 76.4 %
NITRITE UR QL STRIP.AUTO: NEGATIVE
NRBC BLD-RTO: 0 /100 WBCS (ref 0–0)
PH UR STRIP.AUTO: 8 [PH]
PLATELET # BLD AUTO: 255 X10*3/UL (ref 150–450)
POTASSIUM SERPL-SCNC: 5 MMOL/L (ref 3.5–5.3)
PROT SERPL-MCNC: 6.7 G/DL (ref 6.4–8.2)
PROT UR STRIP.AUTO-MCNC: NEGATIVE MG/DL
RBC # BLD AUTO: 4.41 X10*6/UL (ref 4.5–5.9)
RBC # UR STRIP.AUTO: NEGATIVE /UL
SODIUM SERPL-SCNC: 140 MMOL/L (ref 136–145)
SP GR UR STRIP.AUTO: 1.01
UROBILINOGEN UR STRIP.AUTO-MCNC: <2 MG/DL
WBC # BLD AUTO: 8.6 X10*3/UL (ref 4.4–11.3)

## 2024-01-26 PROCEDURE — 70450 CT HEAD/BRAIN W/O DYE: CPT | Performed by: RADIOLOGY

## 2024-01-26 PROCEDURE — 93005 ELECTROCARDIOGRAM TRACING: CPT | Mod: 59

## 2024-01-26 PROCEDURE — 96374 THER/PROPH/DIAG INJ IV PUSH: CPT | Mod: 59

## 2024-01-26 PROCEDURE — 99284 EMERGENCY DEPT VISIT MOD MDM: CPT | Mod: 25,27

## 2024-01-26 PROCEDURE — 84484 ASSAY OF TROPONIN QUANT: CPT | Performed by: EMERGENCY MEDICINE

## 2024-01-26 PROCEDURE — 12013 RPR F/E/E/N/L/M 2.6-5.0 CM: CPT

## 2024-01-26 PROCEDURE — 80053 COMPREHEN METABOLIC PANEL: CPT | Performed by: EMERGENCY MEDICINE

## 2024-01-26 PROCEDURE — 70450 CT HEAD/BRAIN W/O DYE: CPT

## 2024-01-26 PROCEDURE — 83735 ASSAY OF MAGNESIUM: CPT | Performed by: EMERGENCY MEDICINE

## 2024-01-26 PROCEDURE — 2500000004 HC RX 250 GENERAL PHARMACY W/ HCPCS (ALT 636 FOR OP/ED): Performed by: EMERGENCY MEDICINE

## 2024-01-26 PROCEDURE — 85025 COMPLETE CBC W/AUTO DIFF WBC: CPT | Performed by: EMERGENCY MEDICINE

## 2024-01-26 PROCEDURE — 36415 COLL VENOUS BLD VENIPUNCTURE: CPT | Performed by: EMERGENCY MEDICINE

## 2024-01-26 PROCEDURE — 2500000005 HC RX 250 GENERAL PHARMACY W/O HCPCS: Performed by: EMERGENCY MEDICINE

## 2024-01-26 PROCEDURE — 81003 URINALYSIS AUTO W/O SCOPE: CPT | Performed by: EMERGENCY MEDICINE

## 2024-01-26 PROCEDURE — 93010 ELECTROCARDIOGRAM REPORT: CPT | Performed by: INTERNAL MEDICINE

## 2024-01-26 RX ORDER — LORAZEPAM 2 MG/ML
0.5 INJECTION INTRAMUSCULAR ONCE
Status: COMPLETED | OUTPATIENT
Start: 2024-01-26 | End: 2024-01-26

## 2024-01-26 RX ORDER — LIDOCAINE HYDROCHLORIDE 10 MG/ML
4 INJECTION, SOLUTION EPIDURAL; INFILTRATION; INTRACAUDAL; PERINEURAL ONCE
Status: COMPLETED | OUTPATIENT
Start: 2024-01-26 | End: 2024-01-26

## 2024-01-26 RX ADMIN — LORAZEPAM 0.5 MG: 2 INJECTION INTRAMUSCULAR; INTRAVENOUS at 18:22

## 2024-01-26 RX ADMIN — LIDOCAINE HYDROCHLORIDE 40 MG: 10 INJECTION, SOLUTION EPIDURAL; INFILTRATION; INTRACAUDAL; PERINEURAL at 18:22

## 2024-01-26 ASSESSMENT — PAIN - FUNCTIONAL ASSESSMENT: PAIN_FUNCTIONAL_ASSESSMENT: 0-10

## 2024-01-26 NOTE — ED PROVIDER NOTES
HPI   Chief Complaint   Patient presents with    Fall     Mechanical fall at NH, witnessed by roommate, -LOC, -thinners, pt has laceration to forehead, hx of dementia       HPI  Patient is a 78-year-old male with a past medical history significant for CVA, dysarthria, hemiplegia, vascular dementia, hypertension, aphasia, BPH, cognitive communication defect and multiple falls who presents emergency room after a fall.  Per report patient was seen by his roommate in the dementia unit tripping and falling.  Patient cannot contribute to history given significant advanced dementia and aphasia.  He appears to have a new laceration to the left forehead and a skin tear to the right elbow.  He does not appear to be in distress.  Appears to be at his mental baseline per report.  He does have sutures to the right side of the forehead where he apparently had a recent fall, laceration repair.  No anticoagulation listed in the report sent by [facility].      PMHx: As above  PSHx: Unable to obtain   FamilyHx: Unable to obtain  SocialHx: Unable to obtain  Allergies: KDA  Medications: See Medication Reconciliation     ROS  As above but otherwise unable to obtain      Physical Exam    GENERAL: Awake and Alert, No Acute Distress.  Multiple bruises over his whole body in various healing stages.  HEENT: Repaired laceration to the right forehead that appears well-healing and without signs of infection.  Stellate laceration to the left side of the forehead that appears new from today that is now hemostatic.  PERRL, EOMI, Normal Oropharynx, No Signs of Dehydration  NECK: Normal Inspection, No JVD  CARDIOVASCULAR: Irregularly irregular, No M/R/G  RESPIRATORY: CTA Bilaterally, No Wheezes, Rales or Rhonchi, Chest Wall Non-tender  ABDOMEN: Soft, non-tender abdomen, Normal Bowel Sounds, No Distention  BACK: No CVA Tenderness  SKIN: Normal Color, Warm, Dry, No Rashes   EXTREMITIES: Skin tear over the lateral aspect of  "the right elbow.  Measures about 2 cm.  Non-Tender, Full ROM, No Pedal Edema  NEURO: Aphasic, keeps repeating \"woody woody \"for every question.  Per RN staff this is his baseline as they had seen him before.    Nursing Assessment and Vitals Reviewed    Medical Decision  Patient seen and evaluated for mechanical fall as stated by his roommate in the dementia unit.  Workup was performed owing to patient significantly demented and poor historian.  He does appear to have multiple bruises on his entire body at various stages of healing. Repaired laceration to the right forehead that appears well-healing and without signs of infection.  Stellate laceration to the left side of the forehead that appears new from today that is now hemostatic.  There is a skin tear to the lateral aspect of the right arm but no pain.  Full range of motion with good distal pulse motor and sensory function.    Patient laceration repaired per procedure note after thorough cleansing after Ativan administration for agitation.  EMR review showed that patient had tetanus updated on 21 January.    Workup revealed no electrolyte imbalances, normal troponin, no significant leukocytosis or anemia.  CT of the head did not reveal any acute intracranial pathology.  Chronic findings per report.  Patient has remained in his neurologic baseline according to multiple staff members that have met him and cared for him in the past.  As such patient is discharged with instructions to follow-up with his primary care doctor written out.                              [place] Coma Scale Score: 15                  Patient History   Past Medical History:   Diagnosis Date    Personal history of other diseases of the circulatory system     History of hypertension    Personal history of other diseases of the circulatory system 06/04/2018    History of atrial fibrillation    Personal history of other endocrine, nutritional and metabolic disease     History of hyperlipidemia "    Personal history of other medical treatment     History of cardioversion    Unspecified atrial fibrillation (CMS/HCC)     Atrial fibrillation status post cardioversion     Past Surgical History:   Procedure Laterality Date    MR HEAD ANGIO WO IV CONTRAST  2/25/2023    MR HEAD ANGIO WO IV CONTRAST PAR MRI    MR NECK ANGIO WO IV CONTRAST  2/25/2023    MR NECK ANGIO WO IV CONTRAST PAR MRI     No family history on file.  Social History     Tobacco Use    Smoking status: Unknown     Passive exposure: Never (unable to understand)    Smokeless tobacco: Not on file   Vaping Use    Vaping Use: Unknown   Substance Use Topics    Alcohol use: Not on file    Drug use: Not on file     Comment: unable to understand       Physical Exam   ED Triage Vitals [01/26/24 1455]   Temperature Heart Rate Respirations BP   36.8 °C (98.2 °F) 91 18 111/68      Pulse Ox Temp src Heart Rate Source Patient Position   100 % -- -- --      BP Location FiO2 (%)     -- --       Physical Exam    ED Course & MDM   Diagnoses as of 01/26/24 1932   Head injury, initial encounter       Medical Decision Making      Procedure  Procedures     Ana Snyder MD  01/26/24 1932

## 2024-01-26 NOTE — ED PROCEDURE NOTE
Procedure  Laceration Repair    Performed by: Marlena Majano MD  Authorized by: Ana Snyder MD    Consent:     Consent obtained:  Emergent situation  Universal protocol:     Patient identity confirmed:  Arm band  Anesthesia:     Anesthesia method:  Local infiltration    Local anesthetic:  Lidocaine 1% w/o epi  Laceration details:     Location:  Face    Face location:  Forehead    Length (cm):  5  Pre-procedure details:     Preparation:  Patient was prepped and draped in usual sterile fashion  Treatment:     Amount of cleaning:  Extensive    Irrigation solution:  Sterile saline  Skin repair:     Repair method:  Sutures    Suture size:  5-0    Wound skin closure material used: vicryl rapide.    Suture technique:  Simple interrupted    Number of sutures:  6  Approximation:     Approximation:  Close  Repair type:     Repair type:  Simple  Post-procedure details:     Dressing:  Open (no dressing)    Procedure completion:  Tolerated               Marlena Majano MD  Resident  01/26/24 [id number]

## 2024-01-27 VITALS
HEART RATE: 87 BPM | SYSTOLIC BLOOD PRESSURE: 117 MMHG | DIASTOLIC BLOOD PRESSURE: 70 MMHG | BODY MASS INDEX: 17.9 KG/M2 | TEMPERATURE: 98.2 F | HEIGHT: 70 IN | OXYGEN SATURATION: 100 % | RESPIRATION RATE: 16 BRPM | WEIGHT: 125 LBS

## 2024-01-27 NOTE — DISCHARGE INSTRUCTIONS
Please make sure to follow-up with your primary care doctor.  Continue to take your medications as indicated.  Return immediately if concerning symptoms, as discussed.

## 2024-02-02 LAB
Q ONSET: 221 MS
QRS COUNT: 18 BEATS
QRS DURATION: 78 MS
QT INTERVAL: 294 MS
QTC CALCULATION(BAZETT): 393 MS
QTC FREDERICIA: 357 MS
R AXIS: 83 DEGREES
T AXIS: -19 DEGREES
T OFFSET: 368 MS
VENTRICULAR RATE: 108 BPM

## 2024-02-03 ENCOUNTER — APPOINTMENT (OUTPATIENT)
Dept: RADIOLOGY | Facility: HOSPITAL | Age: 79
End: 2024-02-03
Payer: COMMERCIAL

## 2024-02-03 ENCOUNTER — HOSPITAL ENCOUNTER (EMERGENCY)
Facility: HOSPITAL | Age: 79
Discharge: OTHER INSTITUTION | End: 2024-02-04
Attending: EMERGENCY MEDICINE
Payer: COMMERCIAL

## 2024-02-03 VITALS
DIASTOLIC BLOOD PRESSURE: 87 MMHG | SYSTOLIC BLOOD PRESSURE: 143 MMHG | HEIGHT: 70 IN | TEMPERATURE: 98.9 F | RESPIRATION RATE: 14 BRPM | WEIGHT: 125 LBS | BODY MASS INDEX: 17.9 KG/M2 | HEART RATE: 94 BPM | OXYGEN SATURATION: 96 %

## 2024-02-03 DIAGNOSIS — S00.01XA ABRASION OF SCALP, INITIAL ENCOUNTER: ICD-10-CM

## 2024-02-03 DIAGNOSIS — S09.90XA HEAD INJURY, INITIAL ENCOUNTER: ICD-10-CM
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
## 2024-02-03 DIAGNOSIS — S51.811A SKIN TEAR OF RIGHT FOREARM WITHOUT COMPLICATION, INITIAL ENCOUNTER: ICD-10-CM

## 2024-02-03 PROCEDURE — 72170 X-RAY EXAM OF PELVIS: CPT

## 2024-02-03 PROCEDURE — 72170 X-RAY EXAM OF PELVIS: CPT | Performed by: RADIOLOGY

## 2024-02-03 PROCEDURE — 72125 CT NECK SPINE W/O DYE: CPT

## 2024-02-03 PROCEDURE — 72125 CT NECK SPINE W/O DYE: CPT | Performed by: RADIOLOGY

## 2024-02-03 PROCEDURE — 71045 X-RAY EXAM CHEST 1 VIEW: CPT

## 2024-02-03 PROCEDURE — 99285 EMERGENCY DEPT VISIT HI MDM: CPT | Mod: 25 | Performed by: EMERGENCY MEDICINE

## 2024-02-03 PROCEDURE — 71045 X-RAY EXAM CHEST 1 VIEW: CPT | Performed by: RADIOLOGY

## 2024-02-03 PROCEDURE — 70450 CT HEAD/BRAIN W/O DYE: CPT | Performed by: RADIOLOGY

## 2024-02-03 PROCEDURE — 70450 CT HEAD/BRAIN W/O DYE: CPT

## 2024-02-03 ASSESSMENT — COLUMBIA-SUICIDE SEVERITY RATING SCALE - C-SSRS
1. IN THE PAST MONTH, HAVE YOU WISHED YOU WERE DEAD OR WISHED YOU COULD GO TO SLEEP AND NOT WAKE UP?: NO
6. HAVE YOU EVER DONE ANYTHING, STARTED TO DO ANYTHING, OR PREPARED TO DO ANYTHING TO END YOUR LIFE?: NO
2. HAVE YOU ACTUALLY HAD ANY THOUGHTS OF KILLING YOURSELF?: NO

## 2024-02-04 NOTE — ED PROVIDER NOTES
HPI   Chief Complaint   Patient presents with    Fall     Patient fell out of recliner at facility, LOC? -thinners, frequent falls has been seen at this facility several times in the last few weeks for falls, A&Ox1-2,        HPI: []  78-year-old white male with a history of hypertension dementia A-fib on Coumadin, dyslipidemia and BPH some dementia nursing home resident today comes in mechanical fall.  The patient had partially witnessed fall.  No LOC.  No change in mental status.  Patient denies any chest pain pressure heaviness fever chills nausea vomit diarrhea cough congestion incontinence seizures syncope anoscopy no hematemesis melena hematochezia hemoptysis patient has been seen in the ED multiple times for falls recently still has sutures on scalp from recent fall.  He sustained scalp abrasions and right forearm abrasions.    Past history: Hypertension, A-fib, dementia, dyslipidemia, BPH  Social: History of no reported current tobacco alcohol drug abuse.  REVIEW OF SYSTEMS:    GENERAL.: No weight loss, fatigue, anorexia, insomnia, fever.    EYES: No vision loss, double vision, drainage, eye pain.    ENT: No pharyngitis, dry mouth.    CARDIOPULMONARY: No chest pain, palpitations, syncope, near syncope. No shortness of breath, cough, hemoptysis.    GI: No abdominal pain, change in bowel habits, melena, hematemesis, hematochezia, nausea, vomiting, diarrhea.    : No discharge, dysuria, frequency, urgency, hematuria.    MS: No limb pain, joint pain, joint swelling.    SKIN: Positive for scalp and right forearm skin tears and abrasions    PSYCH: No depression, anxiety, suicidality, homicidality.    Review of systems is otherwise negative unless stated above or in history of present illness.  Social history, family history, allergies reviewed.  PHYSICAL EXAM:    GENERAL: Vitals noted, no distress. Alert and oriented  x 0 at his baseline. Non-toxic.      EENT: TMs clear. Posterior oropharynx unremarkable. No  meningismus. No LAD.  Negative hemotympanum negative Shaw sign negative mastoid tenderness    NECK: Supple. Nontender. No midline tenderness.     CARDIAC: Regular, rate, rhythm. No murmurs rubs or gallops. No JVD    PULMONARY: Lungs clear bilaterally with good aeration. No wheezes rales or rhonchi. No respiratory distress.  No tachypnea stridor or retractions    ABDOMEN: Soft, nonsurgical. Nontender. No peritoneal signs. Normoactive bowel sounds. No pulsatile masses.     EXTREMITIES: No peripheral edema. Negative Homans bilaterally, no cords.  2+ bounding pulses well-perfused    SKIN: No rash.  She has right scalp abrasions and multiple right forearm multiple small skin tears  Musculoskeletal: Patient no midline C, T, L-spine tenderness.  Pelvis stable good range of motion of both knees and ankles.  NEURO: No focal neurologic deficits, NIH score of 0. Cranial nerves normal as tested from II through XII.     MEDICAL DECISION MAKING:  CT head shows no traumatic injuries stable chronic changes, CT C-spine shows again chronic DJD no fractures chest x-ray negative pelvis negative    Treatment in ED: Patient C-spine was cleared clinically neurologically.  His wound was cleansed and dressed appropriately.    ED course: Patient remained stable hemodynamic.    Impression: #1 mechanical fall no injuries, #2 scalp abrasion, #3 forearm skin tears    Plan/MDM: 78-year-old white male on Coumadin dementia hypertension A-fib comes in mechanical fall no obvious injuries on imaging.  Neurologic intact GCS 15 has abrasion to the scalp and skin tears that do not require suturing his already tetanus updated.  Low suspicion for stroke TIA infection dehydration or metabolic derangement given the fact it was a mechanical fall was witnessed patient discharged back to the nursing home with strict fall precautions local wound care close outpatient follow-up with strict and precaution.                          Kody Coma Scale Score: 14                   Patient History   Past Medical History:   Diagnosis Date    Personal history of other diseases of the circulatory system     History of hypertension    Personal history of other diseases of the circulatory system 06/04/2018    History of atrial fibrillation    Personal history of other endocrine, nutritional and metabolic disease     History of hyperlipidemia    Personal history of other medical treatment     History of cardioversion    Unspecified atrial fibrillation (CMS/HCC)     Atrial fibrillation status post cardioversion     Past Surgical History:   Procedure Laterality Date    MR HEAD ANGIO WO IV CONTRAST  2/25/2023    MR HEAD ANGIO WO IV CONTRAST PAR MRI    MR NECK ANGIO WO IV CONTRAST  2/25/2023    MR NECK ANGIO WO IV CONTRAST PAR MRI     No family history on file.  Social History     Tobacco Use    Smoking status: Unknown     Passive exposure: Never (unable to understand)    Smokeless tobacco: Not on file   Vaping Use    Vaping Use: Unknown   Substance Use Topics    Alcohol use: Not on file    Drug use: Not on file     Comment: unable to understand       Physical Exam   ED Triage Vitals [02/03/24 1612]   Temperature Heart Rate Respirations BP   36.8 °C (98.2 °F) (!) 102 16 125/78      Pulse Ox Temp Source Heart Rate Source Patient Position   95 % Axillary Monitor Lying      BP Location FiO2 (%)     Left arm --       Physical Exam    ED Course & MDM   ED Course as of 02/03/24 2003   Sat Feb 03, 2024   1920 Patient CT of the head shows no traumatic injury chronic changes CT C-spine again showed DJD no traumatic injury chest x-ray negative x-ray pelvis negative no fractures.  Patient remained stable hemodynamic.  He has abrasions on his scalp which were cleansed and dressed appropriately also has multiple small skin tears of his right forearm which were cleansed and dressed appropriately.  Patient be discharged back to the nursing facility with strict fall precautions local wound care and strict  return precautions. [MT]      ED Course User Index  [MT] Nelly Pino MD         Diagnoses as of 02/03/24 2003   Fall, initial encounter   Head injury, initial encounter   Skin tear of right forearm without complication, initial encounter   Abrasion of scalp, initial encounter       Medical Decision Making      Procedure  Procedures     Nelly Pino MD  02/03/24 2008

## 2024-03-26 ENCOUNTER — APPOINTMENT (OUTPATIENT)
Dept: RADIOLOGY | Facility: HOSPITAL | Age: 79
End: 2024-03-26
Payer: COMMERCIAL

## 2024-03-26 ENCOUNTER — HOSPITAL ENCOUNTER (EMERGENCY)
Facility: HOSPITAL | Age: 79
Discharge: HOME | End: 2024-03-26
Attending: EMERGENCY MEDICINE
Payer: COMMERCIAL

## 2024-03-26 VITALS
BODY MASS INDEX: 17.9 KG/M2 | SYSTOLIC BLOOD PRESSURE: 117 MMHG | OXYGEN SATURATION: 100 % | HEIGHT: 70 IN | TEMPERATURE: 97.7 F | RESPIRATION RATE: 17 BRPM | WEIGHT: 125 LBS | HEART RATE: 85 BPM | DIASTOLIC BLOOD PRESSURE: 89 MMHG

## 2024-03-26 DIAGNOSIS — S01.01XA LACERATION OF SCALP, INITIAL ENCOUNTER: ICD-10-CM

## 2024-03-26 DIAGNOSIS — S09.90XA HEAD INJURY, INITIAL ENCOUNTER: Primary | ICD-10-CM

## 2024-03-26 PROCEDURE — 72125 CT NECK SPINE W/O DYE: CPT | Performed by: RADIOLOGY

## 2024-03-26 PROCEDURE — 70450 CT HEAD/BRAIN W/O DYE: CPT

## 2024-03-26 PROCEDURE — 76377 3D RENDER W/INTRP POSTPROCES: CPT | Performed by: RADIOLOGY

## 2024-03-26 PROCEDURE — 70486 CT MAXILLOFACIAL W/O DYE: CPT | Performed by: RADIOLOGY

## 2024-03-26 PROCEDURE — 70450 CT HEAD/BRAIN W/O DYE: CPT | Performed by: RADIOLOGY

## 2024-03-26 PROCEDURE — 12002 RPR S/N/AX/GEN/TRNK2.6-7.5CM: CPT

## 2024-03-26 PROCEDURE — 72125 CT NECK SPINE W/O DYE: CPT

## 2024-03-26 PROCEDURE — 2500000005 HC RX 250 GENERAL PHARMACY W/O HCPCS: Performed by: EMERGENCY MEDICINE

## 2024-03-26 PROCEDURE — 99285 EMERGENCY DEPT VISIT HI MDM: CPT | Mod: 25

## 2024-03-26 RX ORDER — LIDOCAINE HYDROCHLORIDE 10 MG/ML
5 INJECTION INFILTRATION; PERINEURAL ONCE
Status: COMPLETED | OUTPATIENT
Start: 2024-03-26 | End: 2024-03-26

## 2024-03-26 RX ADMIN — LIDOCAINE HYDROCHLORIDE 50 MG: 10 INJECTION, SOLUTION INFILTRATION; PERINEURAL at 17:25

## 2024-03-26 ASSESSMENT — COLUMBIA-SUICIDE SEVERITY RATING SCALE - C-SSRS
6. HAVE YOU EVER DONE ANYTHING, STARTED TO DO ANYTHING, OR PREPARED TO DO ANYTHING TO END YOUR LIFE?: NO
2. HAVE YOU ACTUALLY HAD ANY THOUGHTS OF KILLING YOURSELF?: NO
1. IN THE PAST MONTH, HAVE YOU WISHED YOU WERE DEAD OR WISHED YOU COULD GO TO SLEEP AND NOT WAKE UP?: NO

## 2024-03-26 NOTE — ED PROVIDER NOTES
HPI   Chief Complaint   Patient presents with    Fall     Pt. Arrived to ED EMS from [facility]. Pt. Had a fall from standing. Per EMS pt. Is negative for thinners        This is a 78-year-old male who presents to the emergency department from his nursing facility after a fall.  The patient has a history of dementia and is unable to provide significant history.  On review of his records from the facility he is not anticoagulated.  The patient has a laceration on the right side of his head that is dressed and Steri-Strips.  The patient himself denies any complaints.    Past medical history: HTN, dementia, A-fib, HLD, BPH                          No data recorded                   Patient History   Past Medical History:   Diagnosis Date    Personal history of other diseases of the circulatory system     History of hypertension    Personal history of other diseases of the circulatory system 06/04/2018    History of atrial fibrillation    Personal history of other endocrine, nutritional and metabolic disease     History of hyperlipidemia    Personal history of other medical treatment     History of cardioversion    Unspecified atrial fibrillation (CMS/HCC)     Atrial fibrillation status post cardioversion     Past Surgical History:   Procedure Laterality Date    MR HEAD ANGIO WO IV CONTRAST  2/25/2023    MR HEAD ANGIO WO IV CONTRAST PAR MRI    MR NECK ANGIO WO IV CONTRAST  2/25/2023    MR NECK ANGIO WO IV CONTRAST PAR MRI     No family history on file.  Social History     Tobacco Use    Smoking status: Unknown     Passive exposure: Never (unable to understand)    Smokeless tobacco: Not on file   Vaping Use    Vaping Use: Unknown   Substance Use Topics    Alcohol use: Not on file    Drug use: Not on file     Comment: unable to understand       Physical Exam   ED Triage Vitals [03/26/24 1610]   Temperature Heart Rate Respirations BP   36.5 °C (97.7 °F) 86 18 102/67      Pulse Ox Temp src Heart Rate Source Patient  Position   98 % -- -- --      BP Location FiO2 (%)     -- --       Physical Exam  Vitals and nursing note reviewed.   HENT:      Head: Normocephalic and atraumatic.      Nose: Nose normal.   Eyes:      Conjunctiva/sclera: Conjunctivae normal.   Cardiovascular:      Rate and Rhythm: Normal rate and regular rhythm.      Pulses: Normal pulses.      Heart sounds: Normal heart sounds.   Pulmonary:      Effort: Pulmonary effort is normal.      Breath sounds: Normal breath sounds.   Abdominal:      General: Bowel sounds are normal.      Palpations: Abdomen is soft.   Musculoskeletal:         General: Normal range of motion.      Cervical back: Normal range of motion and neck supple. No rigidity or tenderness.   Skin:     Findings: No rash.      Comments: 4.5 centimeter laceration to the right side of the head   Neurological:      General: No focal deficit present.      Mental Status: He is alert.   Psychiatric:         Mood and Affect: Mood normal.         ED Course & MDM   Diagnoses as of 03/26/24 2128   Head injury, initial encounter   Laceration of scalp, initial encounter       Medical Decision Making  Differential diagnosis considered: Intracranial hemorrhage, laceration, fracture, cervical spine injury, DJD, sprain, strain    This is a 78-year-old male who presents to the emergency department after a fall.  He has a laceration.  He will be further evaluated with CT head and CT C-spine.  Laceration required sutures.  CT head and C-spine showed no acute traumatic injuries.  The patient was appropriate for discharge and return to his nursing facility.        Procedure  Laceration Repair    Performed by: Chriss Agosto MD  Authorized by: Chriss Agosto MD    Laceration details:     Location:  Scalp    Length (cm):  4.5  Pre-procedure details:     Preparation:  Patient was prepped and draped in usual sterile fashion  Treatment:     Area cleansed with:  Chlorhexidine    Amount of cleaning:  Standard    Irrigation  solution:  Sterile saline    Irrigation volume:  20 ml    Irrigation method:  Syringe    Visualized foreign bodies/material removed: no      Debridement:  None    Undermining:  None    Scar revision: no    Skin repair:     Repair method:  Sutures    Suture size:  5-0    Suture material:  Chromic gut    Suture technique:  Simple interrupted    Number of sutures:  6       Chriss Agosto MD  03/26/24 7420

## 2024-05-13 ENCOUNTER — OFFICE VISIT (OUTPATIENT)
Dept: VASCULAR SURGERY | Facility: HOSPITAL | Age: 79
End: 2024-05-13
Payer: COMMERCIAL

## 2024-05-13 VITALS
RESPIRATION RATE: 16 BRPM | WEIGHT: 118.2 LBS | OXYGEN SATURATION: 95 % | SYSTOLIC BLOOD PRESSURE: 129 MMHG | HEART RATE: 75 BPM | HEIGHT: 70 IN | BODY MASS INDEX: 16.92 KG/M2 | DIASTOLIC BLOOD PRESSURE: 75 MMHG

## 2024-05-13 DIAGNOSIS — I73.9 PVD (PERIPHERAL VASCULAR DISEASE) (CMS-HCC): Primary | ICD-10-CM

## 2024-05-13 PROCEDURE — 1159F MED LIST DOCD IN RCRD: CPT | Performed by: SURGERY

## 2024-05-13 PROCEDURE — 99214 OFFICE O/P EST MOD 30 MIN: CPT | Performed by: SURGERY

## 2024-05-13 PROCEDURE — 99204 OFFICE O/P NEW MOD 45 MIN: CPT | Performed by: SURGERY

## 2024-05-13 RX ORDER — AMIODARONE HYDROCHLORIDE 200 MG/1
200 TABLET ORAL DAILY
COMMUNITY
Start: 2024-04-08

## 2024-05-13 ASSESSMENT — ENCOUNTER SYMPTOMS
WOUND: 1
ABDOMINAL PAIN: 0
LOSS OF SENSATION IN FEET: 1
OCCASIONAL FEELINGS OF UNSTEADINESS: 1
DEPRESSION: 0
SHORTNESS OF BREATH: 0

## 2024-05-13 NOTE — PROGRESS NOTES
Vascular Surgery Consult/Clinic Note    CC: Right leg wound    HPI:  Rodri Whitfield is 78 y.o. male with history of dementia, HTN, a-fib, HLD and BPH who was referred for medial malleolus and small right heel wound.   He is accompanied by his caregiver from the nursing facility but she does not know much of his medical condition.       Meds:   Current Outpatient Medications on File Prior to Visit   Medication Sig Dispense Refill    acetaminophen (Tylenol) 325 mg tablet Take 2 tablets (650 mg) by mouth every 6 hours if needed for mild pain (1 - 3).      albuterol 90 mcg/actuation inhaler Inhale 2 puffs every 4 hours if needed for shortness of breath.      amiodarone (Pacerone) 200 mg tablet Take 1 tablet (200 mg) by mouth once daily.      cholecalciferol (Vitamin D-3) 25 MCG (1000 UT) tablet Take 1 tablet (25 mcg) by mouth once daily.      gabapentin (Neurontin) 100 mg capsule Take 1 capsule (100 mg) by mouth 2 times a day. For neuropathy.      lactase (Lactaid) 3,000 unit tablet Take 1 tablet by mouth 3 times a day before meals. For dairy intolerance.      lisinopril 10 mg tablet Take 1 tablet (10 mg) by mouth once daily.      loperamide (Imodium) 2 mg capsule Take 1 capsule (2 mg) by mouth every 8 hours if needed for diarrhea.      LORazepam (Ativan) 1 mg tablet Take 1 tablet (1 mg) by mouth every 8 hours if needed for anxiety.      melatonin 10 mg tablet Take 1 tablet (10 mg) by mouth as needed at bedtime (FOR INSOMNIA).      nicotine polacrilex (Commit) 2 mg lozenge Take 1 lozenge (2 mg) by mouth every 4 hours if needed for smoking cessation.      ondansetron (Zofran) 4 mg tablet Take 1 tablet (4 mg) by mouth every 4 hours if needed for nausea or vomiting.      psyllium (Metamucil) 3.4 gram packet Take 1 packet by mouth once daily.      sertraline (Zoloft) 100 mg tablet Take 1 tablet (100 mg) by mouth once daily. FOR DEPRESSION      tamsulosin (Flomax) 0.4 mg 24 hr capsule Take 1 capsule (0.4 mg) by mouth once  daily at bedtime.       No current facility-administered medications on file prior to visit.        Allergies:   No Known Allergies    SH:    Social Determinants of Health     Tobacco Use: Unknown (2/3/2024)    Patient History     Smoking Tobacco Use: Unknown     Smokeless Tobacco Use: Unknown     Passive Exposure: Never   Alcohol Use: Not At Risk (12/22/2023)    AUDIT-C     Frequency of Alcohol Consumption: Never     Average Number of Drinks: Patient does not drink     Frequency of Binge Drinking: Never   Recent Concern: Alcohol Use - Alcohol Misuse (11/2/2023)    AUDIT-C     Frequency of Alcohol Consumption: 2-3 times a week     Average Number of Drinks: 1 or 2     Frequency of Binge Drinking: Weekly   Financial Resource Strain: Low Risk  (11/2/2023)    Overall Financial Resource Strain (CARDIA)     Difficulty of Paying Living Expenses: Not hard at all   Food Insecurity: No Food Insecurity (11/2/2023)    Hunger Vital Sign     Worried About Running Out of Food in the Last Year: Never true     Ran Out of Food in the Last Year: Never true   Transportation Needs: No Transportation Needs (11/2/2023)    PRAPARE - Transportation     Lack of Transportation (Medical): No     Lack of Transportation (Non-Medical): No   Physical Activity: Inactive (11/2/2023)    Exercise Vital Sign     Days of Exercise per Week: 0 days     Minutes of Exercise per Session: 0 min   Stress: No Stress Concern Present (11/2/2023)    [employer] [employer] - Occupational Stress Questionnaire     Feeling of Stress : Not at all   Social Connections: Socially Isolated (11/2/2023)    Social Connection and Isolation Panel [NHANES]     Frequency of Communication with Friends and Family: Never     Frequency of Social Gatherings with Friends and Family: Twice a week     Attends Quaker Services: Never     Active Member of Clubs or Organizations: No     Attends Club or Organization Meetings: Never     Marital Status:    Intimate  Partner Violence: Not At Risk (11/2/2023)    Humiliation, Afraid, Rape, and Kick questionnaire     Fear of Current or Ex-Partner: No     Emotionally Abused: No     Physically Abused: No     Sexually Abused: No   Depression: Not at risk (12/22/2023)    PHQ-2     PHQ-2 Score: 0   Housing Stability: Low Risk  (11/2/2023)    Housing Stability Vital Sign     Unable to Pay for Housing in the Last Year: No     Number of Places Lived in the Last Year: 1     Unstable Housing in the Last Year: No   Utilities: Not At Risk (11/2/2023)    [facility] Utilities     Threatened with loss of utilities: No   Digital Equity: Not on file   Health Literacy: Not on file        FH:  No family history on file.     ROS:  Review of Systems   HENT:  Negative for congestion.    Eyes:  Negative for visual disturbance.   Respiratory:  Negative for shortness of breath.    Cardiovascular:  Negative for chest pain.   Gastrointestinal:  Negative for abdominal pain.   Skin:  Positive for wound.        Objective:  Vitals:  Vitals:    05/13/24 0847   BP: 129/75   Pulse: 75   Resp: 16   SpO2: 95%        Exam:  Gen: in NAD  GI: Soft, ND/NT  Ext:  Right medial malleolus with shallow wound.   Right heel with clean <1cm wound.     Imaging:  None available.     Assessment & Plan:  Rodri Whitfield is 78 y.o. male with extensive PMH as noted above who is presents with chronic right leg wound.   - JUDY, and RLE arterial duplex.   - Cont. Wound care at the nursing facility with wound care team and podiatrist.     - Follow up in 2-4 weeks with vascular lab studies.       Donald Buchanan MD, PhD  Clinical   [employer]  Co-Director, Aortic Center  [facility] [place]

## 2024-05-31 ENCOUNTER — APPOINTMENT (OUTPATIENT)
Dept: RADIOLOGY | Facility: HOSPITAL | Age: 79
End: 2024-05-31
Payer: COMMERCIAL

## 2024-05-31 ENCOUNTER — HOSPITAL ENCOUNTER (EMERGENCY)
Facility: HOSPITAL | Age: 79
Discharge: HOME | End: 2024-06-01
Attending: EMERGENCY MEDICINE
Payer: COMMERCIAL

## 2024-05-31 DIAGNOSIS — S09.90XA INJURY OF HEAD, INITIAL ENCOUNTER: ICD-10-CM
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
## 2024-05-31 DIAGNOSIS — S01.111A LACERATION OF RIGHT EYEBROW, INITIAL ENCOUNTER: Primary | ICD-10-CM

## 2024-05-31 LAB
ABO GROUP (TYPE) IN BLOOD: NORMAL
ALBUMIN SERPL BCP-MCNC: 3.5 G/DL (ref 3.4–5)
ALP SERPL-CCNC: 102 U/L (ref 33–136)
ALT SERPL W P-5'-P-CCNC: 12 U/L (ref 10–52)
ANION GAP SERPL CALC-SCNC: 12 MMOL/L (ref 10–20)
ANTIBODY SCREEN: NORMAL
APTT PPP: 30 SECONDS (ref 27–38)
AST SERPL W P-5'-P-CCNC: 14 U/L (ref 9–39)
BASOPHILS # BLD AUTO: 0.04 X10*3/UL (ref 0–0.1)
BASOPHILS NFR BLD AUTO: 0.5 %
BILIRUB SERPL-MCNC: 0.4 MG/DL (ref 0–1.2)
BUN SERPL-MCNC: 35 MG/DL (ref 6–23)
CALCIUM SERPL-MCNC: 8.6 MG/DL (ref 8.6–10.3)
CHLORIDE SERPL-SCNC: 106 MMOL/L (ref 98–107)
CO2 SERPL-SCNC: 26 MMOL/L (ref 21–32)
CREAT SERPL-MCNC: 1.53 MG/DL (ref 0.5–1.3)
EGFRCR SERPLBLD CKD-EPI 2021: 46 ML/MIN/1.73M*2
EOSINOPHIL # BLD AUTO: 0.07 X10*3/UL (ref 0–0.4)
EOSINOPHIL NFR BLD AUTO: 0.8 %
ERYTHROCYTE [DISTWIDTH] IN BLOOD BY AUTOMATED COUNT: 15.3 % (ref 11.5–14.5)
GLUCOSE SERPL-MCNC: 97 MG/DL (ref 74–99)
HCT VFR BLD AUTO: 41.6 % (ref 41–52)
HGB BLD-MCNC: 12.8 G/DL (ref 13.5–17.5)
IMM GRANULOCYTES # BLD AUTO: 0.04 X10*3/UL (ref 0–0.5)
IMM GRANULOCYTES NFR BLD AUTO: 0.5 % (ref 0–0.9)
INR PPP: 1 (ref 0.9–1.1)
LYMPHOCYTES # BLD AUTO: 0.83 X10*3/UL (ref 0.8–3)
LYMPHOCYTES NFR BLD AUTO: 9.6 %
MCH RBC QN AUTO: 28.1 PG (ref 26–34)
MCHC RBC AUTO-ENTMCNC: 30.8 G/DL (ref 32–36)
MCV RBC AUTO: 91 FL (ref 80–100)
MONOCYTES # BLD AUTO: 0.44 X10*3/UL (ref 0.05–0.8)
MONOCYTES NFR BLD AUTO: 5.1 %
NEUTROPHILS # BLD AUTO: 7.2 X10*3/UL (ref 1.6–5.5)
NEUTROPHILS NFR BLD AUTO: 83.5 %
NRBC BLD-RTO: 0 /100 WBCS (ref 0–0)
PLATELET # BLD AUTO: 252 X10*3/UL (ref 150–450)
POTASSIUM SERPL-SCNC: 5.3 MMOL/L (ref 3.5–5.3)
PROT SERPL-MCNC: 6.6 G/DL (ref 6.4–8.2)
PROTHROMBIN TIME: 11.6 SECONDS (ref 9.8–12.8)
RBC # BLD AUTO: 4.56 X10*6/UL (ref 4.5–5.9)
RH FACTOR (ANTIGEN D): NORMAL
SODIUM SERPL-SCNC: 139 MMOL/L (ref 136–145)
WBC # BLD AUTO: 8.6 X10*3/UL (ref 4.4–11.3)

## 2024-05-31 PROCEDURE — 86901 BLOOD TYPING SEROLOGIC RH(D): CPT | Performed by: STUDENT IN AN ORGANIZED HEALTH CARE EDUCATION/TRAINING PROGRAM

## 2024-05-31 PROCEDURE — 84075 ASSAY ALKALINE PHOSPHATASE: CPT | Performed by: STUDENT IN AN ORGANIZED HEALTH CARE EDUCATION/TRAINING PROGRAM

## 2024-05-31 PROCEDURE — 85610 PROTHROMBIN TIME: CPT | Performed by: STUDENT IN AN ORGANIZED HEALTH CARE EDUCATION/TRAINING PROGRAM

## 2024-05-31 PROCEDURE — 85025 COMPLETE CBC W/AUTO DIFF WBC: CPT | Performed by: STUDENT IN AN ORGANIZED HEALTH CARE EDUCATION/TRAINING PROGRAM

## 2024-05-31 PROCEDURE — 96360 HYDRATION IV INFUSION INIT: CPT | Mod: 59

## 2024-05-31 PROCEDURE — 36415 COLL VENOUS BLD VENIPUNCTURE: CPT | Performed by: STUDENT IN AN ORGANIZED HEALTH CARE EDUCATION/TRAINING PROGRAM

## 2024-05-31 PROCEDURE — 70450 CT HEAD/BRAIN W/O DYE: CPT

## 2024-05-31 PROCEDURE — 72125 CT NECK SPINE W/O DYE: CPT | Performed by: STUDENT IN AN ORGANIZED HEALTH CARE EDUCATION/TRAINING PROGRAM

## 2024-05-31 PROCEDURE — 12011 RPR F/E/E/N/L/M 2.5 CM/<: CPT | Performed by: STUDENT IN AN ORGANIZED HEALTH CARE EDUCATION/TRAINING PROGRAM

## 2024-05-31 PROCEDURE — 2500000005 HC RX 250 GENERAL PHARMACY W/O HCPCS: Performed by: STUDENT IN AN ORGANIZED HEALTH CARE EDUCATION/TRAINING PROGRAM

## 2024-05-31 PROCEDURE — 72125 CT NECK SPINE W/O DYE: CPT

## 2024-05-31 PROCEDURE — 99285 EMERGENCY DEPT VISIT HI MDM: CPT | Mod: 25

## 2024-05-31 PROCEDURE — 70450 CT HEAD/BRAIN W/O DYE: CPT | Performed by: STUDENT IN AN ORGANIZED HEALTH CARE EDUCATION/TRAINING PROGRAM

## 2024-05-31 PROCEDURE — 2500000004 HC RX 250 GENERAL PHARMACY W/ HCPCS (ALT 636 FOR OP/ED): Performed by: STUDENT IN AN ORGANIZED HEALTH CARE EDUCATION/TRAINING PROGRAM

## 2024-05-31 RX ORDER — LIDOCAINE HYDROCHLORIDE 10 MG/ML
10 INJECTION INFILTRATION; PERINEURAL ONCE
Status: COMPLETED | OUTPATIENT
Start: 2024-05-31 | End: 2024-05-31

## 2024-05-31 RX ADMIN — LIDOCAINE HYDROCHLORIDE 100 MG: 10 INJECTION, SOLUTION INFILTRATION; PERINEURAL at 17:53

## 2024-05-31 RX ADMIN — SODIUM CHLORIDE, POTASSIUM CHLORIDE, SODIUM LACTATE AND CALCIUM CHLORIDE 1000 ML: 600; 310; 30; 20 INJECTION, SOLUTION INTRAVENOUS at 18:03

## 2024-05-31 ASSESSMENT — PAIN SCALES - GENERAL: PAINLEVEL_OUTOF10: 0 - NO PAIN

## 2024-05-31 ASSESSMENT — PAIN - FUNCTIONAL ASSESSMENT: PAIN_FUNCTIONAL_ASSESSMENT: 0-10

## 2024-05-31 NOTE — ED PROVIDER NOTES
HPI:  Patient is a 78-year-old male with a history of dementia, COPD/asthma, atrial fibrillation, hypertension, depression, BPH who presents via EMS from nursing home for evaluation status post fall.  Patient unable to provide history secondary to dementia.  Per nursing home RN (via phone) the patient set up from his wheelchair at the lunch table and subsequently fell forward.  She denies loss of consciousness.  Patient has not any blood thinners confirmed by nursing home.  No observed seizure-like activity.    ROS: unable to assess, 2/2 dementia, pt with word salad    PMH/PSH: Reviewed in EMR. As above in HPI.  SH: NH Denies EtOH, tobacco or illicit drug use.  Allergies: No Known Allergies   Medications: See prescription writer for full medication list.     General: no acute distress, word salad  HEENT:  No rhinorrhea. MMM. 1.5 x 1.5 x 1.5 cm stellate laceration noted to the R eyebrow otherwise NC/AT. PERRL.  Neck: No midline or paraspinal C-spine tenderness to palpation.  No bony deformities or step-offs.  Cardiac: regular rate rhythm, no murmurs  Chest: No sternal or bilateral rib tenderness to palpation.  No bony deformities or step-offs.  No crepitus.  Pulm:  normal respiratory effort on room air, equal chest expansion, clear bilaterally, no wheeze or crackles  GI: soft, nontender, nondistended, +BS  Extremities: Pelvis is stable, moves all extremities freely, no edema noted  Back: No midline or paraspinal T/L-spine tenderness to palpation.  No bony deformities or step-offs. Pt able to lean forward in stretcher independently  Skin: warm, well-perfused, as above otherwise no lesions noted on exposed skin.   Neuro:  oriented to self only, moves all 4 extremities freely and independently.    Assessment/Plan/MDM  Patient is a 78-year-old male with a history of dementia, COPD/asthma, atrial fibrillation, hypertension, depression, BPH who presents via EMS from nursing home for evaluation status post fall.  Tetanus  updated this year.  Patient has not any blood thinners.  He appears to be at baseline.  Laceration repaired by myself, see procedure note for details.  CT head and C-spine pending at time of shift change, patient signed out in stable condition.  Hemoglobin stable.  No electrolyte derangement.  Patient has a mild ELLY treated with IV fluids.    ED Course/Progress:    Diagnoses as of 05/31/24 1759   Laceration of right eyebrow, initial encounter   Fall from standing, initial encounter        Clinical Impression: as above  Dispo: deferred to Dr. Henao    Pt seen and discussed with attending physician, Dr. Henao.  Sydnee Harman MD  PGY3, Emergency Medicine    Disclaimer: This note was dictated by speech recognition. An attempt at proof reading was made to minimize errors. Errors in transcription may be present.  Please call if questions.      Sydnee Harman MD  Resident  05/31/24 1800

## 2024-05-31 NOTE — ED TRIAGE NOTES
Pt was brought in by ems from and [facility] for a fall. Pt is unable to answer questions. Ems states that the pt fell forward out of his w.c. and landed face first. Pt has a small lac above his right eye and smeared the blood all over his face.

## 2024-05-31 NOTE — PROGRESS NOTES
I received Rodri Whitfield in signout from Dr. Harman.  Please see the previous note for all HPI, PE and MDM up to the time of signout at 1800.  This is in addition to the primary documentation.    In brief Rodri Whitfield is an 78 y.o. male with PMHx dementia, COPD/asthma, atrial fibrillation, hypertension, depression, BPH who presents via EMS from nursing home for evaluation after fall from standing.     Chief Complaint   Patient presents with    Fall      At the time of signout we were awaiting: CT head and C-spine    MDM:  Briefly, pt is a 78 y.o. male with PMHx dementia, COPD/asthma, Afib, htn, depression, BPH who presented via EMS for after a fall. His mental status remained at his baseline. Labs showed mild ELLY now s/p fluids, were otherwise noncontributory. CT head and C spine w/o evidence of traumatic injury.     Assessment and plan discussed with Dr. Henao.    Disposition: Patient is safe for discharge back to his facility at this time. Reviewed return precautions including worsening headache, nausea/vomiting, dizziness, or confusion.     Prisca Jordan MD   Emergency Medicine, PGY-1

## 2024-05-31 NOTE — ED PROCEDURE NOTE
Procedure  Laceration Repair    Performed by: Sydnee Harman MD  Authorized by: Vince Henao MD    Consent:     Consent obtained:  Verbal    Consent given by:  Patient  Anesthesia:     Anesthesia method:  Local infiltration    Local anesthetic:  Lidocaine 1% w/o epi  Laceration details:     Location:  Face    Face location:  R eyebrow    Length (cm):  1.5 (stellate laceration)  Treatment:     Area cleansed with:  Saline    Amount of cleaning:  Standard    Irrigation solution:  Sterile saline  Skin repair:     Repair method:  Sutures    Suture size:  5-0    Suture material:  Fast-absorbing gut    Number of sutures:  7  Approximation:     Approximation:  Close  Repair type:     Repair type:  Simple  Post-procedure details:     Dressing:  Non-adherent dressing    Procedure completion:  Tolerated well, no immediate complications               Sydnee Harman MD  Resident  05/31/24 3865

## 2024-05-31 NOTE — DISCHARGE INSTRUCTIONS
Return to ER for worsening headache, nausea/vomiting, dizziness, or confusion.  Follow up with your primary care provider or neurologist (or return to the ER) for any headache, nausea/vomiting or dizziness that does not improve over the course of 1 week. Head injuries require rest from electronics and physical activity. Avoid any situation that may put you at high risk for repeat head trauma during the recovery period.    Please keep sutures dry and covered for 24 hours.  In 24 hours use gentle soap and water daily to your laceration.  Do not submerge in water until the sutures have been removed (i.e. baths, swimming), but it is OK to shower.  If you develop redness, red streaking, fever, chills, or swelling, please return to the ER immediately.  Please have your sutures removed in 5-7 days.  Suture removal likely can be performed by your doctor or at an urgent care clinic.      Yes...

## 2024-06-01 VITALS
SYSTOLIC BLOOD PRESSURE: 143 MMHG | BODY MASS INDEX: 20.4 KG/M2 | TEMPERATURE: 99.2 F | RESPIRATION RATE: 15 BRPM | HEART RATE: 78 BPM | HEIGHT: 67 IN | DIASTOLIC BLOOD PRESSURE: 82 MMHG | OXYGEN SATURATION: 98 % | WEIGHT: 130 LBS

## 2024-06-12 ENCOUNTER — APPOINTMENT (OUTPATIENT)
Dept: VASCULAR MEDICINE | Facility: CLINIC | Age: 79
End: 2024-06-12
Payer: COMMERCIAL

## 2024-06-17 ENCOUNTER — OFFICE VISIT (OUTPATIENT)
Dept: VASCULAR SURGERY | Facility: HOSPITAL | Age: 79
End: 2024-06-17
Payer: COMMERCIAL

## 2024-07-01 ENCOUNTER — HOSPITAL ENCOUNTER (OUTPATIENT)
Dept: VASCULAR MEDICINE | Facility: CLINIC | Age: 79
Discharge: HOME | End: 2024-07-01
Payer: COMMERCIAL

## 2024-07-01 DIAGNOSIS — I73.9 PVD (PERIPHERAL VASCULAR DISEASE) (CMS-HCC): ICD-10-CM

## 2024-07-01 PROCEDURE — 93922 UPR/L XTREMITY ART 2 LEVELS: CPT | Performed by: STUDENT IN AN ORGANIZED HEALTH CARE EDUCATION/TRAINING PROGRAM

## 2024-07-01 PROCEDURE — 93922 UPR/L XTREMITY ART 2 LEVELS: CPT

## 2024-07-08 ENCOUNTER — HOSPITAL ENCOUNTER (OUTPATIENT)
Dept: VASCULAR MEDICINE | Facility: HOSPITAL | Age: 79
Discharge: HOME | End: 2024-07-08
Payer: COMMERCIAL

## 2024-07-08 ENCOUNTER — OFFICE VISIT (OUTPATIENT)
Dept: VASCULAR SURGERY | Facility: HOSPITAL | Age: 79
End: 2024-07-08
Payer: COMMERCIAL

## 2024-07-08 VITALS
OXYGEN SATURATION: 96 % | HEIGHT: 66 IN | DIASTOLIC BLOOD PRESSURE: 69 MMHG | WEIGHT: 120 LBS | BODY MASS INDEX: 19.29 KG/M2 | SYSTOLIC BLOOD PRESSURE: 118 MMHG | HEART RATE: 72 BPM

## 2024-07-08 DIAGNOSIS — I73.9 PVD (PERIPHERAL VASCULAR DISEASE) (CMS-HCC): Primary | ICD-10-CM

## 2024-07-08 DIAGNOSIS — I73.9 PVD (PERIPHERAL VASCULAR DISEASE) (CMS-HCC): ICD-10-CM

## 2024-07-08 DIAGNOSIS — I70.221 ATHEROSCLEROSIS OF NATIVE ARTERIES OF EXTREMITIES WITH REST PAIN, RIGHT LEG (MULTI): ICD-10-CM

## 2024-07-08 PROCEDURE — 93926 LOWER EXTREMITY STUDY: CPT

## 2024-07-08 PROCEDURE — 99214 OFFICE O/P EST MOD 30 MIN: CPT | Mod: 25 | Performed by: SURGERY

## 2024-07-08 PROCEDURE — 99214 OFFICE O/P EST MOD 30 MIN: CPT | Performed by: SURGERY

## 2024-07-08 PROCEDURE — 93926 LOWER EXTREMITY STUDY: CPT | Performed by: SURGERY

## 2024-07-08 PROCEDURE — 1159F MED LIST DOCD IN RCRD: CPT | Performed by: SURGERY

## 2024-07-08 ASSESSMENT — ENCOUNTER SYMPTOMS
OCCASIONAL FEELINGS OF UNSTEADINESS: 1
DEPRESSION: 0
LOSS OF SENSATION IN FEET: 0

## 2024-07-08 NOTE — PROGRESS NOTES
Vascular Surgery Consult/Clinic Note    CC: Follow up     HPI:  Rodri Whitfield is 78 y.o. male with history of severe dementia, CVA, hemiplegia, aphasia, HTN, HLD, Afib, bladder cancer (5/2011) is here for right lower extremity medial malleolus wound and right heel wound.     Meds:   Current Outpatient Medications on File Prior to Visit   Medication Sig Dispense Refill    acetaminophen (Tylenol) 325 mg tablet Take 2 tablets (650 mg) by mouth every 6 hours if needed for mild pain (1 - 3).      albuterol 90 mcg/actuation inhaler Inhale 2 puffs every 4 hours if needed for shortness of breath.      amiodarone (Pacerone) 200 mg tablet Take 1 tablet (200 mg) by mouth once daily.      cholecalciferol (Vitamin D-3) 25 MCG (1000 UT) tablet Take 1 tablet (25 mcg) by mouth once daily.      gabapentin (Neurontin) 100 mg capsule Take 1 capsule (100 mg) by mouth 2 times a day. For neuropathy.      lactase (Lactaid) 3,000 unit tablet Take 1 tablet by mouth 3 times a day before meals. For dairy intolerance.      lisinopril 10 mg tablet Take 1 tablet (10 mg) by mouth once daily.      loperamide (Imodium) 2 mg capsule Take 1 capsule (2 mg) by mouth every 8 hours if needed for diarrhea.      LORazepam (Ativan) 1 mg tablet Take 1 tablet (1 mg) by mouth every 8 hours if needed for anxiety.      melatonin 10 mg tablet Take 1 tablet (10 mg) by mouth as needed at bedtime (FOR INSOMNIA).      nicotine polacrilex (Commit) 2 mg lozenge Take 1 lozenge (2 mg) by mouth every 4 hours if needed for smoking cessation.      ondansetron (Zofran) 4 mg tablet Take 1 tablet (4 mg) by mouth every 4 hours if needed for nausea or vomiting.      psyllium (Metamucil) 3.4 gram packet Take 1 packet by mouth once daily.      sertraline (Zoloft) 100 mg tablet Take 1 tablet (100 mg) by mouth once daily. FOR DEPRESSION      tamsulosin (Flomax) 0.4 mg 24 hr capsule Take 1 capsule (0.4 mg) by mouth once daily at bedtime.       No current facility-administered  medications on file prior to visit.        Allergies:   No Known Allergies    SH:    Social Determinants of Health     Tobacco Use: Medium Risk (7/8/2024)    Patient History     Smoking Tobacco Use: Former     Smokeless Tobacco Use: Unknown     Passive Exposure: Never   Alcohol Use: Not At Risk (12/22/2023)    AUDIT-C     Frequency of Alcohol Consumption: Never     Average Number of Drinks: Patient does not drink     Frequency of Binge Drinking: Never   Recent Concern: Alcohol Use - Alcohol Misuse (11/2/2023)    AUDIT-C     Frequency of Alcohol Consumption: 2-3 times a week     Average Number of Drinks: 1 or 2     Frequency of Binge Drinking: Weekly   Financial Resource Strain: Low Risk  (11/2/2023)    Overall Financial Resource Strain (CARDIA)     Difficulty of Paying Living Expenses: Not hard at all   Food Insecurity: No Food Insecurity (11/2/2023)    Hunger Vital Sign     Worried About Running Out of Food in the Last Year: Never true     Ran Out of Food in the Last Year: Never true   Transportation Needs: No Transportation Needs (11/2/2023)    PRAPARE - Transportation     Lack of Transportation (Medical): No     Lack of Transportation (Non-Medical): No   Physical Activity: Inactive (11/2/2023)    Exercise Vital Sign     Days of Exercise per Week: 0 days     Minutes of Exercise per Session: 0 min   Stress: No Stress Concern Present (11/2/2023)    [employer] [place] [employer] - Occupational Stress Questionnaire     Feeling of Stress : Not at all   Social Connections: Socially Isolated (11/2/2023)    Social Connection and Isolation Panel [NHANES]     Frequency of Communication with Friends and Family: Never     Frequency of Social Gatherings with Friends and Family: Twice a week     Attends Judaism Services: Never     Active Member of Clubs or Organizations: No     Attends Club or Organization Meetings: Never     Marital Status:    Intimate Partner Violence: Not At Risk (11/2/2023)     "Humiliation, Afraid, Rape, and Kick questionnaire     Fear of Current or Ex-Partner: No     Emotionally Abused: No     Physically Abused: No     Sexually Abused: No   Depression: Not at risk (12/22/2023)    PHQ-2     PHQ-2 Score: 0   Housing Stability: Low Risk  (11/2/2023)    Housing Stability Vital Sign     Unable to Pay for Housing in the Last Year: No     Number of Places Lived in the Last Year: 1     Unstable Housing in the Last Year: No   Utilities: Not At Risk (11/2/2023)    [facility] Utilities     Threatened with loss of utilities: No   Digital Equity: Not on file   Health Literacy: Not on file        FH:  No family history on file.       Objective:  Vitals: /69 (BP Location: Left arm)   Pulse 72   Ht 1.676 m (5' 6\")   Wt 54.4 kg (120 lb)   SpO2 96%   BMI 19.37 kg/m²     Exam:  Gen: in NAD  GI: Soft, ND/NT  Ext: Right medial malleolus dry and right heel wound clean, dry, and intact, no erythema or edema.       Imaging:    Vascular US JUDY 7/1/2024 independently reviewed.  Right JUDY 0.0 toe, unable to obtain PT and DP waveforms.   Left JUDY: 1.78 DP, 0.88 PT, 0.00 toe    Vascular US arterial duplex RLE 7/8/2024 independently reviewed.   Distal SFA stenosis > 50%, popliteal artery with collaterals.         Assessment & Plan:  Rodri Whitfield is 78 y.o. male with history of  CVA, hemiplegia, aphasia, HTN, HLD, Afib, bladder cancer (5/2011) who presents with chronic right lower extremity wound.  He is accompanied by his caregiver from SNF. He has significant cognitive decline from severe dementia. No toe wounds present.    - Compared to prior visit wounds are healing and are clean, dry, intact, with no erythema or edema.    - Continue wound care at skilled nursing facility.   - Return to clinic as needed.      Maribel Terrell, YANET-CNP          "

## 2024-10-07 ENCOUNTER — APPOINTMENT (OUTPATIENT)
Dept: RADIOLOGY | Facility: HOSPITAL | Age: 79
End: 2024-10-07
Payer: COMMERCIAL

## 2024-10-07 ENCOUNTER — HOSPITAL ENCOUNTER (EMERGENCY)
Facility: HOSPITAL | Age: 79
Discharge: HOME | End: 2024-10-08
Attending: EMERGENCY MEDICINE
Payer: COMMERCIAL

## 2024-10-07 VITALS
HEIGHT: 66 IN | TEMPERATURE: 97.9 F | BODY MASS INDEX: 19.29 KG/M2 | HEART RATE: 62 BPM | OXYGEN SATURATION: 96 % | SYSTOLIC BLOOD PRESSURE: 119 MMHG | DIASTOLIC BLOOD PRESSURE: 78 MMHG | WEIGHT: 120 LBS | RESPIRATION RATE: 16 BRPM
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
## 2024-10-07 DIAGNOSIS — S01.01XA LACERATION OF SCALP, INITIAL ENCOUNTER: ICD-10-CM

## 2024-10-07 PROCEDURE — 72125 CT NECK SPINE W/O DYE: CPT

## 2024-10-07 PROCEDURE — 12001 RPR S/N/AX/GEN/TRNK 2.5CM/<: CPT | Performed by: EMERGENCY MEDICINE

## 2024-10-07 PROCEDURE — 72125 CT NECK SPINE W/O DYE: CPT | Performed by: RADIOLOGY

## 2024-10-07 PROCEDURE — 72170 X-RAY EXAM OF PELVIS: CPT

## 2024-10-07 PROCEDURE — 72170 X-RAY EXAM OF PELVIS: CPT | Performed by: RADIOLOGY

## 2024-10-07 PROCEDURE — 99285 EMERGENCY DEPT VISIT HI MDM: CPT | Mod: 25

## 2024-10-07 PROCEDURE — 70450 CT HEAD/BRAIN W/O DYE: CPT

## 2024-10-07 PROCEDURE — 70450 CT HEAD/BRAIN W/O DYE: CPT | Performed by: RADIOLOGY

## 2024-10-07 ASSESSMENT — PAIN SCALES - PAIN ASSESSMENT IN ADVANCED DEMENTIA (PAINAD)
BODYLANGUAGE: RELAXED
CONSOLABILITY: NO NEED TO CONSOLE
TOTALSCORE: 0
BREATHING: NORMAL
FACIALEXPRESSION: SMILING OR INEXPRESSIVE

## 2024-10-07 ASSESSMENT — PAIN - FUNCTIONAL ASSESSMENT: PAIN_FUNCTIONAL_ASSESSMENT: PAINAD (PAIN ASSESSMENT IN ADVANCED DEMENTIA SCALE)

## 2024-10-07 NOTE — ED PROVIDER NOTES
HPI   No chief complaint on file.      HPI: []  79-year-old white male history of hypertension, A-fib, craniotomy in the remote past lives in a nursing home comes in with mechanical fall.  It was witnessed.  He fell off his wheelchair.  Sustained a laceration to his scalp.  No LOC.  No change in mental status.  No fever chills nausea vomit diarrhea cough congestion incontinence seizures syncope onus syncope no hematemesis melena hematochezia no hemoptysis no recent travel hospitalization or antibiotic use.    Past history: Hypertension, A-fib, CVA, craniotomy  Social: No history reported tobacco alcohol drug abuse.  REVIEW OF SYSTEMS:    GENERAL.: No weight loss, fatigue, anorexia, insomnia, fever.    EYES: No vision loss, double vision, drainage, eye pain.    ENT: No pharyngitis, dry mouth.    CARDIOPULMONARY: No chest pain, palpitations, syncope, near syncope. No shortness of breath, cough, hemoptysis.    GI: No abdominal pain, change in bowel habits, melena, hematemesis, hematochezia, nausea, vomiting, diarrhea.    : No discharge, dysuria, frequency, urgency, hematuria.    MS: No limb pain, joint pain, joint swelling.    SKIN: No rashes.  Positive scalp laceration    PSYCH: No depression, anxiety, suicidality, homicidality.    Review of systems is otherwise negative unless stated above or in history of present illness.  Social history, family history, allergies reviewed.  PHYSICAL EXAM:    GENERAL: Vitals noted, no distress. Alert and oriented  x 0 at his baseline. Non-toxic.      EENT: TMs clear. Posterior oropharynx unremarkable. No meningismus. No LAD.  Negative hemotympanum negative Shaw sign negative mastoid tenderness    NECK: Supple. Nontender. No midline tenderness.     CARDIAC: Regular, rate, rhythm. No murmurs rubs or gallops. No JVD    PULMONARY: Lungs clear bilaterally with good aeration. No wheezes rales or rhonchi. No respiratory distress.     ABDOMEN: Soft, nonsurgical. Nontender. No peritoneal  signs. Normoactive bowel sounds. No pulsatile masses.     EXTREMITIES: No peripheral edema. Negative Homans bilaterally, no cords.  2+ bounding pulses well-perfused.    SKIN: No rash.  2 cm laceration on the scalp frontal area, also has a chronic appearing abrasions to his left knee    NEURO: Patient at his baseline neurologic status with no new focal neurologic deficits.  Musculoskeletal: No midline C, T, L-spine tenderness.  Pelvis stable good range of motion of both hip knees and ankles.  Range of motion was limited due to contractures  MEDICAL DECISION MAKING:  CT head shows a remote craniotomy changes nothing acute CT C-spine showed DJD no fractures x-ray pelvis shows no fractures.    Treatment ED: Patient's laceration was repaired with 4 interrupted staples with good approximation.    ED course: Patient remained stable hemodynamic.  Impression: #1 mechanical fall, #2 scalp laceration    Plan/MDM: 79-year-old white male history of remote craniotomy, A-fib comes in with mechanical fall currently at his baseline mental status is a primary second survey is negative.  His scalp laceration which was repaired with without any complications currently low suspicion for stroke TIA infection dehydration sepsis septic shock.  Patient's fall was witnessed.  Patient has low suspicion for traumatic injury chest abdomen pelvis and T-spine L-spine.  Patient be discharged back to the nursing facility with local wound care staple removal in 7 days time with close outpatient follow-up and strict return precaution.              Patient History   Past Medical History:   Diagnosis Date    Personal history of other diseases of the circulatory system     History of hypertension    Personal history of other diseases of the circulatory system 06/04/2018    History of atrial fibrillation    Personal history of other endocrine, nutritional and metabolic disease     History of hyperlipidemia    Personal history of other medical treatment      History of cardioversion    Unspecified atrial fibrillation (Multi)     Atrial fibrillation status post cardioversion     Past Surgical History:   Procedure Laterality Date    MR HEAD ANGIO WO IV CONTRAST  2/25/2023    MR HEAD ANGIO WO IV CONTRAST PAR MRI    MR NECK ANGIO WO IV CONTRAST  2/25/2023    MR NECK ANGIO WO IV CONTRAST PAR MRI     No family history on file.  Social History     Tobacco Use    Smoking status: Former     Types: Cigarettes     Passive exposure: Never (unable to understand)    Smokeless tobacco: Not on file   Vaping Use    Vaping status: Unknown   Substance Use Topics    Alcohol use: Not on file    Drug use: Not on file     Comment: unable to understand       Physical Exam   ED Triage Vitals [10/07/24 1415]   Temperature Heart Rate Respirations BP   36.6 °C (97.9 °F) 61 14 111/70      Pulse Ox Temp Source Heart Rate Source Patient Position   96 % Oral Monitor Lying      BP Location FiO2 (%)     Left arm --       Physical Exam      ED Course & [facility]   ED Course as of 10/07/24 1718   Mon Oct 07, 2024   1702 CT head negative/has stable postop changes from previous craniotomy, CT C-spine showed DJD x-ray pelvis negative for traumatic injury.  Laceration repaired with 4 interrupted staples with a wound approximation and patient will be discharged back to nursing facility. [MT]      ED Course User Index  [MT] Nelly Pino MD         Diagnoses as of 10/07/24 1718   Fall, initial encounter   Laceration of scalp, initial encounter                 No data recorded     [place] Coma Scale Score: 12 (10/07/24 1415 : Rajwinder Jesus, YUMIKO)                           Medical Decision Making      Procedure  Laceration Repair    Performed by: Nelly Pino MD  Authorized by: Nelly Pino MD    Consent:     Consent obtained:  Emergent situation    Consent given by: emergent situation.    Risks discussed:  Infection, pain, retained foreign body, tendon damage, poor cosmetic result, nerve damage,  poor wound healing, vascular damage and need for additional repair    Alternatives discussed:  No treatment, delayed treatment, observation and referral  Universal protocol:     Procedure explained and questions answered to patient or proxy's satisfaction: yes      Relevant documents present and verified: yes      Test results available: yes      Imaging studies available: yes      Required blood products, implants, devices, and special equipment available: yes      Site/side marked: yes      Immediately prior to procedure, a time out was called: yes      Patient identity confirmed:  Arm band and hospital-assigned identification number  Anesthesia:     Anesthesia method:  None  Laceration details:     Location:  Scalp    Scalp location:  Frontal    Length (cm):  2    Depth (mm):  5  Pre-procedure details:     Preparation:  Patient was prepped and draped in usual sterile fashion and imaging obtained to evaluate for foreign bodies  Exploration:     Limited defect created (wound extended): no      Hemostasis achieved with:  Direct pressure    Imaging outcome: foreign body not noted      Wound exploration: entire depth of wound visualized      Contaminated: no    Treatment:     Area cleansed with:  Chlorhexidine    Amount of cleaning:  Standard    Debridement:  None    Undermining:  None    Scar revision: no      Layers repaired: skin and subcutaneious tissue.  Skin repair:     Repair method:  Staples    Number of staples:  4  Approximation:     Approximation:  Close  Repair type:     Repair type:  Simple  Post-procedure details:     Dressing:  Open (no dressing)    Procedure completion:  Tolerated       Nelly Pino MD  10/07/24 [id number]

## 2024-10-07 NOTE — ED NOTES
Patient came from SNF. He fell and hit his head. Patient has a laceration to right forehead. Patient has dementia and does not respond to all questions. His paperwork has that he takes blood thinners.      Rajwinder Jesus RN  10/07/24 [id number]

## 2024-10-16 ENCOUNTER — APPOINTMENT (OUTPATIENT)
Dept: NEUROLOGY | Facility: HOSPITAL | Age: 79
End: 2024-10-16
Payer: COMMERCIAL

## 2024-10-30 ENCOUNTER — OFFICE VISIT (OUTPATIENT)
Dept: NEUROLOGY | Facility: CLINIC | Age: 79
End: 2024-10-30
Payer: COMMERCIAL

## 2024-10-30 VITALS
WEIGHT: 120 LBS | HEIGHT: 66 IN | HEART RATE: 85 BPM | DIASTOLIC BLOOD PRESSURE: 75 MMHG | SYSTOLIC BLOOD PRESSURE: 115 MMHG | BODY MASS INDEX: 19.29 KG/M2

## 2024-10-30 DIAGNOSIS — I63.9 CEREBROVASCULAR ACCIDENT (CVA), UNSPECIFIED MECHANISM (MULTI): Primary | ICD-10-CM
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
## 2024-10-30 DIAGNOSIS — I67.1 CEREBRAL ANEURYSM (HHS-HCC): ICD-10-CM

## 2024-10-30 PROCEDURE — 99205 OFFICE O/P NEW HI 60 MIN: CPT | Performed by: SPECIALIST

## 2024-10-30 PROCEDURE — 99215 OFFICE O/P EST HI 40 MIN: CPT | Performed by: SPECIALIST

## 2024-10-30 PROCEDURE — 1159F MED LIST DOCD IN RCRD: CPT | Performed by: SPECIALIST

## 2024-11-06 PROBLEM — I11.9 HYPERTENSIVE HEART DISEASE WITHOUT HEART FAILURE: Status: ACTIVE | Noted: 2023-02-28

## 2024-11-06 PROBLEM — F17.200 NICOTINE DEPENDENCE, UNSPECIFIED, UNCOMPLICATED: Status: ACTIVE | Noted: 2023-02-28

## 2024-11-06 PROBLEM — T07.XXXA MULTIPLE OPEN WOUNDS: Status: ACTIVE | Noted: 2024-04-03

## 2024-11-06 PROBLEM — I69.320 APHASIA FOLLOWING CEREBRAL INFARCTION: Status: ACTIVE | Noted: 2023-02-28

## 2024-11-06 PROBLEM — F32.A DEPRESSION, UNSPECIFIED: Status: ACTIVE | Noted: 2023-03-07

## 2024-11-06 PROBLEM — L89.610 PRESSURE INJURY OF RIGHT HEEL, UNSTAGEABLE (MULTI): Status: ACTIVE | Noted: 2024-04-03

## 2024-11-06 PROBLEM — R32 URINARY INCONTINENCE: Status: ACTIVE | Noted: 2024-04-03

## 2024-11-06 PROBLEM — F03.90 DEMENTIA: Status: ACTIVE | Noted: 2023-05-16

## 2024-11-06 PROBLEM — R19.7 DIARRHEA: Status: ACTIVE | Noted: 2023-05-13

## 2024-11-06 PROBLEM — R53.1 WEAKNESS: Status: ACTIVE | Noted: 2023-02-28

## 2024-11-06 PROBLEM — E87.6 HYPOKALEMIA: Status: ACTIVE | Noted: 2023-05-16

## 2024-11-06 PROBLEM — E87.0 HYPERNATREMIA: Status: ACTIVE | Noted: 2023-05-17

## 2024-11-06 PROBLEM — R45.1 AGITATION: Status: ACTIVE | Noted: 2023-05-16

## 2024-11-06 PROBLEM — E43 SEVERE PROTEIN-CALORIE MALNUTRITION (MULTI): Status: ACTIVE | Noted: 2023-05-14

## 2024-11-06 PROBLEM — I48.0 PAROXYSMAL ATRIAL FIBRILLATION (MULTI): Status: ACTIVE | Noted: 2024-11-06

## 2024-11-06 PROBLEM — F01.54: Status: ACTIVE | Noted: 2023-03-07

## 2024-11-06 PROBLEM — N40.0 BENIGN PROSTATIC HYPERPLASIA WITHOUT LOWER URINARY TRACT SYMPTOMS: Status: ACTIVE | Noted: 2023-02-28

## 2024-11-06 PROBLEM — I69.352 HEMIPLEGIA AND HEMIPARESIS FOLLOWING CEREBRAL INFARCTION AFFECTING LEFT DOMINANT SIDE (MULTI): Status: ACTIVE | Noted: 2023-02-28

## 2024-11-06 PROBLEM — E78.5 HYPERLIPIDEMIA, UNSPECIFIED: Status: ACTIVE | Noted: 2023-02-28

## 2024-11-06 PROBLEM — R26.2 DIFFICULTY IN WALKING, NOT ELSEWHERE CLASSIFIED: Status: ACTIVE | Noted: 2023-02-28

## 2024-11-06 PROBLEM — I69.322 DYSARTHRIA FOLLOWING CEREBRAL INFARCTION: Status: ACTIVE | Noted: 2023-02-28

## 2024-11-06 PROBLEM — F41.9 ANXIETY DISORDER, UNSPECIFIED: Status: ACTIVE | Noted: 2023-03-07

## 2024-11-06 PROBLEM — R15.9 INCONTINENCE OF FECES: Status: ACTIVE | Noted: 2024-04-03

## 2024-11-08 ENCOUNTER — APPOINTMENT (OUTPATIENT)
Dept: PRIMARY CARE | Facility: CLINIC | Age: 79
End: 2024-11-08
Payer: COMMERCIAL